# Patient Record
Sex: FEMALE | Race: WHITE | NOT HISPANIC OR LATINO | URBAN - METROPOLITAN AREA
[De-identification: names, ages, dates, MRNs, and addresses within clinical notes are randomized per-mention and may not be internally consistent; named-entity substitution may affect disease eponyms.]

---

## 2023-03-06 ENCOUNTER — TELEPHONE (OUTPATIENT)
Dept: PRIMARY CARE | Facility: CLINIC | Age: 63
End: 2023-03-06
Payer: COMMERCIAL

## 2023-03-06 RX ORDER — ERGOCALCIFEROL 1.25 MG/1
1 CAPSULE ORAL
COMMUNITY
Start: 2018-05-17 | End: 2023-03-07 | Stop reason: SDUPTHER

## 2023-03-07 DIAGNOSIS — E55.9 VITAMIN D DEFICIENCY: Primary | ICD-10-CM

## 2023-03-07 DIAGNOSIS — E55.9 VITAMIN D DEFICIENCY: ICD-10-CM

## 2023-03-07 RX ORDER — ERGOCALCIFEROL 1.25 MG/1
1 CAPSULE ORAL
Qty: 12 CAPSULE | Refills: 0 | Status: SHIPPED | OUTPATIENT
Start: 2023-03-07 | End: 2024-02-15 | Stop reason: SDUPTHER

## 2023-03-07 RX ORDER — PAROXETINE HYDROCHLORIDE 20 MG/1
1 TABLET, FILM COATED ORAL DAILY
COMMUNITY
End: 2023-08-04

## 2023-03-07 RX ORDER — HYDROCHLOROTHIAZIDE 25 MG/1
1 TABLET ORAL DAILY
COMMUNITY
Start: 2019-08-08 | End: 2023-05-04

## 2023-03-07 RX ORDER — LISINOPRIL 20 MG/1
1 TABLET ORAL DAILY
COMMUNITY
Start: 2022-06-07 | End: 2023-04-03 | Stop reason: HOSPADM

## 2023-03-07 RX ORDER — METFORMIN HYDROCHLORIDE 1000 MG/1
1 TABLET ORAL 2 TIMES DAILY
COMMUNITY
End: 2023-05-01 | Stop reason: DRUGHIGH

## 2023-03-07 RX ORDER — ROSUVASTATIN CALCIUM 5 MG/1
1 TABLET, COATED ORAL DAILY
COMMUNITY
Start: 2020-12-15 | End: 2023-07-31

## 2023-03-07 RX ORDER — METOPROLOL SUCCINATE 50 MG/1
1 TABLET, EXTENDED RELEASE ORAL DAILY
COMMUNITY
Start: 2019-08-08 | End: 2023-04-04

## 2023-03-07 RX ORDER — ORAL SEMAGLUTIDE 7 MG/1
1 TABLET ORAL DAILY
COMMUNITY
Start: 2022-04-18 | End: 2023-10-09

## 2023-03-07 RX ORDER — ERGOCALCIFEROL 1.25 MG/1
1 CAPSULE ORAL
Qty: 12 CAPSULE | Refills: 3 | Status: SHIPPED | OUTPATIENT
Start: 2023-03-07 | End: 2023-03-07 | Stop reason: SDUPTHER

## 2023-04-03 DIAGNOSIS — I10 PRIMARY HYPERTENSION: Primary | ICD-10-CM

## 2023-04-03 PROBLEM — J32.9 SINOBRONCHITIS: Status: ACTIVE | Noted: 2023-04-03

## 2023-04-03 PROBLEM — E78.00 ELEVATED SERUM CHOLESTEROL: Status: ACTIVE | Noted: 2023-04-03

## 2023-04-03 PROBLEM — F41.9 ANXIETY DISORDER: Status: ACTIVE | Noted: 2023-04-03

## 2023-04-03 PROBLEM — B37.31 YEAST INFECTION OF THE VAGINA: Status: ACTIVE | Noted: 2023-04-03

## 2023-04-03 PROBLEM — M77.9 TENDONITIS: Status: ACTIVE | Noted: 2023-04-03

## 2023-04-03 PROBLEM — G47.33 OBSTRUCTIVE SLEEP APNEA: Status: ACTIVE | Noted: 2023-04-03

## 2023-04-03 PROBLEM — M22.2X9 PATELLOFEMORAL SYNDROME: Status: ACTIVE | Noted: 2023-04-03

## 2023-04-03 PROBLEM — L28.1 PRURIGO NODULARIS: Status: ACTIVE | Noted: 2023-04-03

## 2023-04-03 PROBLEM — E11.8 TYPE 2 DIABETES MELLITUS WITH COMPLICATION, WITHOUT LONG-TERM CURRENT USE OF INSULIN (MULTI): Status: ACTIVE | Noted: 2023-04-03

## 2023-04-03 PROBLEM — U07.1 COVID-19: Status: ACTIVE | Noted: 2023-04-03

## 2023-04-03 PROBLEM — J30.9 ALLERGIC RHINITIS: Status: ACTIVE | Noted: 2023-04-03

## 2023-04-03 PROBLEM — E55.9 VITAMIN D DEFICIENCY: Status: ACTIVE | Noted: 2023-04-03

## 2023-04-03 PROBLEM — H35.30 AMD (AGE RELATED MACULAR DEGENERATION): Status: ACTIVE | Noted: 2023-04-03

## 2023-04-03 PROBLEM — G43.909 MIGRAINE: Status: ACTIVE | Noted: 2023-04-03

## 2023-04-03 PROBLEM — R53.83 FATIGUE: Status: ACTIVE | Noted: 2023-04-03

## 2023-04-03 PROBLEM — C53.9 CERVICAL CANCER (MULTI): Status: ACTIVE | Noted: 2023-04-03

## 2023-04-03 PROBLEM — R06.83 SNORING: Status: ACTIVE | Noted: 2023-04-03

## 2023-04-03 PROBLEM — R60.0 EDEMA OF LEFT LOWER EXTREMITY: Status: ACTIVE | Noted: 2023-04-03

## 2023-04-03 PROBLEM — J40 SINOBRONCHITIS: Status: ACTIVE | Noted: 2023-04-03

## 2023-04-03 PROBLEM — R92.8 ABNORMAL MAMMOGRAM: Status: ACTIVE | Noted: 2023-04-03

## 2023-04-03 RX ORDER — LISINOPRIL 40 MG/1
40 TABLET ORAL
COMMUNITY
Start: 2023-02-06 | End: 2023-07-03

## 2023-04-04 RX ORDER — METOPROLOL SUCCINATE 50 MG/1
TABLET, EXTENDED RELEASE ORAL
Qty: 90 TABLET | Refills: 0 | Status: SHIPPED | OUTPATIENT
Start: 2023-04-04 | End: 2023-07-21

## 2023-04-24 ENCOUNTER — TELEPHONE (OUTPATIENT)
Dept: PRIMARY CARE | Facility: CLINIC | Age: 63
End: 2023-04-24
Payer: COMMERCIAL

## 2023-04-24 NOTE — TELEPHONE ENCOUNTER
Patient called asking if you want her to get labs drawn before her appointment on May 1st, 2023? If so, please put orders in. She would like them mailed to her beforehand.

## 2023-04-25 DIAGNOSIS — E55.9 VITAMIN D DEFICIENCY: ICD-10-CM

## 2023-04-25 DIAGNOSIS — Z00.00 WELL ADULT HEALTH CHECK: ICD-10-CM

## 2023-04-25 DIAGNOSIS — I10 PRIMARY HYPERTENSION: ICD-10-CM

## 2023-04-25 DIAGNOSIS — E11.8 TYPE 2 DIABETES MELLITUS WITH COMPLICATION, WITHOUT LONG-TERM CURRENT USE OF INSULIN (MULTI): Primary | ICD-10-CM

## 2023-04-25 PROBLEM — J32.9 SINOBRONCHITIS: Status: RESOLVED | Noted: 2023-04-03 | Resolved: 2023-04-25

## 2023-04-25 PROBLEM — J40 SINOBRONCHITIS: Status: RESOLVED | Noted: 2023-04-03 | Resolved: 2023-04-25

## 2023-04-25 PROBLEM — R06.83 SNORING: Status: RESOLVED | Noted: 2023-04-03 | Resolved: 2023-04-25

## 2023-04-25 PROBLEM — M77.9 TENDONITIS: Status: RESOLVED | Noted: 2023-04-03 | Resolved: 2023-04-25

## 2023-04-25 PROBLEM — U07.1 COVID-19: Status: RESOLVED | Noted: 2023-04-03 | Resolved: 2023-04-25

## 2023-04-25 PROBLEM — B37.31 YEAST INFECTION OF THE VAGINA: Status: RESOLVED | Noted: 2023-04-03 | Resolved: 2023-04-25

## 2023-05-01 ENCOUNTER — OFFICE VISIT (OUTPATIENT)
Dept: PRIMARY CARE | Facility: CLINIC | Age: 63
End: 2023-05-01
Payer: COMMERCIAL

## 2023-05-01 VITALS
HEIGHT: 65 IN | BODY MASS INDEX: 39.72 KG/M2 | DIASTOLIC BLOOD PRESSURE: 96 MMHG | SYSTOLIC BLOOD PRESSURE: 153 MMHG | OXYGEN SATURATION: 97 % | RESPIRATION RATE: 16 BRPM | TEMPERATURE: 97.8 F | WEIGHT: 238.4 LBS | HEART RATE: 74 BPM

## 2023-05-01 DIAGNOSIS — E11.8 TYPE 2 DIABETES MELLITUS WITH COMPLICATION, WITHOUT LONG-TERM CURRENT USE OF INSULIN (MULTI): Primary | ICD-10-CM

## 2023-05-01 DIAGNOSIS — I10 PRIMARY HYPERTENSION: ICD-10-CM

## 2023-05-01 DIAGNOSIS — C53.0 MALIGNANT NEOPLASM OF ENDOCERVIX (MULTI): ICD-10-CM

## 2023-05-01 DIAGNOSIS — Z00.00 WELL ADULT HEALTH CHECK: ICD-10-CM

## 2023-05-01 PROBLEM — R60.0 EDEMA OF LEFT LOWER EXTREMITY: Status: RESOLVED | Noted: 2023-04-03 | Resolved: 2023-05-01

## 2023-05-01 PROBLEM — M22.2X9 PATELLOFEMORAL SYNDROME: Status: RESOLVED | Noted: 2023-04-03 | Resolved: 2023-05-01

## 2023-05-01 PROBLEM — E78.5 HYPERLIPIDEMIA: Status: ACTIVE | Noted: 2023-05-01

## 2023-05-01 PROCEDURE — 4010F ACE/ARB THERAPY RXD/TAKEN: CPT | Performed by: FAMILY MEDICINE

## 2023-05-01 PROCEDURE — 3077F SYST BP >= 140 MM HG: CPT | Performed by: FAMILY MEDICINE

## 2023-05-01 PROCEDURE — 99213 OFFICE O/P EST LOW 20 MIN: CPT | Performed by: FAMILY MEDICINE

## 2023-05-01 PROCEDURE — 1036F TOBACCO NON-USER: CPT | Performed by: FAMILY MEDICINE

## 2023-05-01 PROCEDURE — 99396 PREV VISIT EST AGE 40-64: CPT | Performed by: FAMILY MEDICINE

## 2023-05-01 PROCEDURE — 3080F DIAST BP >= 90 MM HG: CPT | Performed by: FAMILY MEDICINE

## 2023-05-01 RX ORDER — METFORMIN HYDROCHLORIDE 500 MG/1
TABLET, EXTENDED RELEASE ORAL
Qty: 120 TABLET | Refills: 6 | Status: SHIPPED | OUTPATIENT
Start: 2023-05-01 | End: 2024-01-02 | Stop reason: SDUPTHER

## 2023-05-01 ASSESSMENT — ENCOUNTER SYMPTOMS
OCCASIONAL FEELINGS OF UNSTEADINESS: 0
LIGHT-HEADEDNESS: 0
FEVER: 0
DEPRESSION: 0
DIZZINESS: 0
LOSS OF SENSATION IN FEET: 0
NAUSEA: 0
DIARRHEA: 1
SHORTNESS OF BREATH: 0
BLOOD IN STOOL: 0
VOMITING: 0

## 2023-05-01 ASSESSMENT — PATIENT HEALTH QUESTIONNAIRE - PHQ9
1. LITTLE INTEREST OR PLEASURE IN DOING THINGS: NOT AT ALL
SUM OF ALL RESPONSES TO PHQ9 QUESTIONS 1 AND 2: 0
2. FEELING DOWN, DEPRESSED OR HOPELESS: NOT AT ALL

## 2023-05-01 NOTE — ASSESSMENT & PLAN NOTE
Monitor your blood pressure at home at least once a week and record.  Please bring results to your next visit.  Take your medicine as prescribed.    Exercise at least 30 minutes daily.  Lose weight.   Avoid eating processed foods, canned foods, etc.    Limit adding salt at the table.

## 2023-05-01 NOTE — PROGRESS NOTES
"Subjective   Valentine Calero is a 62 y.o. female who presents for Annual Exam (Annual physical examination ).    Here for a physical.    Gets diarrhea on and off.  Not sure what it is from.               Review of Systems   Constitutional:  Negative for fever.   Respiratory:  Negative for shortness of breath.    Cardiovascular:  Negative for chest pain.   Gastrointestinal:  Positive for diarrhea. Negative for blood in stool, nausea and vomiting.   Neurological:  Negative for dizziness and light-headedness.   All other systems reviewed and are negative.      Objective   BP (!) 153/96   Pulse 74   Temp 36.6 °C (97.8 °F)   Resp 16   Ht 1.651 m (5' 5\")   Wt 108 kg (238 lb 6.4 oz)   SpO2 97%   BMI 39.67 kg/m²     Physical Exam  HENT:      Head: Normocephalic and atraumatic.      Mouth/Throat:      Mouth: Mucous membranes are moist.      Pharynx: Oropharynx is clear.   Eyes:      Extraocular Movements: Extraocular movements intact.      Pupils: Pupils are equal, round, and reactive to light.   Cardiovascular:      Rate and Rhythm: Normal rate and regular rhythm.      Heart sounds: Normal heart sounds.   Pulmonary:      Effort: Pulmonary effort is normal.      Breath sounds: Normal breath sounds.   Musculoskeletal:         General: Normal range of motion.      Cervical back: Normal range of motion.   Lymphadenopathy:      Cervical: No cervical adenopathy.   Skin:     General: Skin is warm and dry.   Neurological:      General: No focal deficit present.      Mental Status: She is alert.   Psychiatric:         Mood and Affect: Mood normal.         Assessment/Plan   Problem List Items Addressed This Visit          Circulatory    Hypertension     Monitor your blood pressure at home at least once a week and record.  Please bring results to your next visit.  Take your medicine as prescribed.    Exercise at least 30 minutes daily.  Lose weight.   Avoid eating processed foods, canned foods, etc.    Limit adding salt at " the table.             Relevant Orders    Follow Up In [facility] - PCP       Genitourinary    Cervical cancer (CMS/HCC)     Pap next visit.            Endocrine/Metabolic    Type 2 diabetes mellitus with complication, without long-term current use of insulin (CMS/HCC) - Primary    Relevant Medications    metFORMIN XR (Glucophage-XR) 500 mg 24 hr tablet       Other    Well adult health check         There are no Patient Instructions on file for this visit.

## 2023-05-03 DIAGNOSIS — I10 PRIMARY HYPERTENSION: Primary | ICD-10-CM

## 2023-05-04 RX ORDER — HYDROCHLOROTHIAZIDE 25 MG/1
TABLET ORAL
Qty: 90 TABLET | Refills: 1 | Status: SHIPPED | OUTPATIENT
Start: 2023-05-04 | End: 2024-01-16 | Stop reason: SDUPTHER

## 2023-05-04 RX ORDER — METFORMIN HYDROCHLORIDE 1000 MG/1
TABLET ORAL
Qty: 180 TABLET | Refills: 0 | OUTPATIENT
Start: 2023-05-04

## 2023-07-02 DIAGNOSIS — I10 PRIMARY HYPERTENSION: Primary | ICD-10-CM

## 2023-07-03 RX ORDER — LISINOPRIL 40 MG/1
TABLET ORAL
Qty: 90 TABLET | Refills: 0 | Status: SHIPPED | OUTPATIENT
Start: 2023-07-03 | End: 2023-11-06 | Stop reason: SDUPTHER

## 2023-07-11 ENCOUNTER — APPOINTMENT (OUTPATIENT)
Dept: LAB | Facility: LAB | Age: 63
End: 2023-07-11
Payer: COMMERCIAL

## 2023-07-11 ENCOUNTER — LAB (OUTPATIENT)
Dept: LAB | Facility: LAB | Age: 63
End: 2023-07-11
Payer: COMMERCIAL

## 2023-07-11 DIAGNOSIS — E55.9 VITAMIN D DEFICIENCY: ICD-10-CM

## 2023-07-11 DIAGNOSIS — Z00.00 WELL ADULT HEALTH CHECK: ICD-10-CM

## 2023-07-11 DIAGNOSIS — I10 PRIMARY HYPERTENSION: ICD-10-CM

## 2023-07-11 DIAGNOSIS — E11.8 TYPE 2 DIABETES MELLITUS WITH COMPLICATION, WITHOUT LONG-TERM CURRENT USE OF INSULIN (MULTI): ICD-10-CM

## 2023-07-11 LAB
ALANINE AMINOTRANSFERASE (SGPT) (U/L) IN SER/PLAS: 28 U/L (ref 7–45)
ALBUMIN (G/DL) IN SER/PLAS: 4.4 G/DL (ref 3.4–5)
ALBUMIN (MG/L) IN URINE: 29.6 MG/L
ALBUMIN/CREATININE (UG/MG) IN URINE: 23.7 UG/MG CRT (ref 0–30)
ALKALINE PHOSPHATASE (U/L) IN SER/PLAS: 51 U/L (ref 33–136)
ANION GAP IN SER/PLAS: 14 MMOL/L (ref 10–20)
ASPARTATE AMINOTRANSFERASE (SGOT) (U/L) IN SER/PLAS: 29 U/L (ref 9–39)
BILIRUBIN TOTAL (MG/DL) IN SER/PLAS: 0.7 MG/DL (ref 0–1.2)
CALCIDIOL (25 OH VITAMIN D3) (NG/ML) IN SER/PLAS: 34 NG/ML
CALCIUM (MG/DL) IN SER/PLAS: 9.4 MG/DL (ref 8.6–10.3)
CARBON DIOXIDE, TOTAL (MMOL/L) IN SER/PLAS: 29 MMOL/L (ref 21–32)
CHLORIDE (MMOL/L) IN SER/PLAS: 100 MMOL/L (ref 98–107)
CHOLESTEROL (MG/DL) IN SER/PLAS: 171 MG/DL (ref 0–199)
CHOLESTEROL IN HDL (MG/DL) IN SER/PLAS: 41.2 MG/DL
CHOLESTEROL/HDL RATIO: 4.2
CREATININE (MG/DL) IN SER/PLAS: 0.63 MG/DL (ref 0.5–1.05)
CREATININE (MG/DL) IN URINE: 125 MG/DL (ref 20–320)
ESTIMATED AVERAGE GLUCOSE FOR HBA1C: 183 MG/DL
GFR FEMALE: >90 ML/MIN/1.73M2
GLUCOSE (MG/DL) IN SER/PLAS: 137 MG/DL (ref 74–99)
HEMOGLOBIN A1C/HEMOGLOBIN TOTAL IN BLOOD: 8 %
LDL: 93 MG/DL (ref 0–99)
POTASSIUM (MMOL/L) IN SER/PLAS: 3.9 MMOL/L (ref 3.5–5.3)
PROTEIN TOTAL: 7.2 G/DL (ref 6.4–8.2)
SODIUM (MMOL/L) IN SER/PLAS: 139 MMOL/L (ref 136–145)
THYROTROPIN (MIU/L) IN SER/PLAS BY DETECTION LIMIT <= 0.05 MIU/L: 3 MIU/L (ref 0.44–3.98)
TRIGLYCERIDE (MG/DL) IN SER/PLAS: 183 MG/DL (ref 0–149)
UREA NITROGEN (MG/DL) IN SER/PLAS: 10 MG/DL (ref 6–23)
VLDL: 37 MG/DL (ref 0–40)

## 2023-07-11 PROCEDURE — 36415 COLL VENOUS BLD VENIPUNCTURE: CPT

## 2023-07-11 PROCEDURE — 80053 COMPREHEN METABOLIC PANEL: CPT

## 2023-07-11 PROCEDURE — 83036 HEMOGLOBIN GLYCOSYLATED A1C: CPT

## 2023-07-11 PROCEDURE — 80061 LIPID PANEL: CPT

## 2023-07-11 PROCEDURE — 84443 ASSAY THYROID STIM HORMONE: CPT

## 2023-07-11 PROCEDURE — 82043 UR ALBUMIN QUANTITATIVE: CPT

## 2023-07-11 PROCEDURE — 82570 ASSAY OF URINE CREATININE: CPT

## 2023-07-11 PROCEDURE — 82306 VITAMIN D 25 HYDROXY: CPT

## 2023-07-12 ENCOUNTER — TELEPHONE (OUTPATIENT)
Dept: PRIMARY CARE | Facility: CLINIC | Age: 63
End: 2023-07-12
Payer: COMMERCIAL

## 2023-07-12 NOTE — TELEPHONE ENCOUNTER
----- Message from Naz Becerra MD sent at 7/11/2023  6:33 PM EDT -----  Please let her know her labs look ok.   ----- Message -----  From: Lab, Background User  Sent: 7/11/2023   5:15 PM EDT  To: Naz Becerra MD

## 2023-07-17 ENCOUNTER — TELEPHONE (OUTPATIENT)
Dept: PRIMARY CARE | Facility: CLINIC | Age: 63
End: 2023-07-17
Payer: COMMERCIAL

## 2023-07-17 NOTE — TELEPHONE ENCOUNTER
----- Message from Naz Becerra MD sent at 7/14/2023  2:20 PM EDT -----  Please call the patient regarding her abnormal result. Her hgb A1c is much higher than last time.  Make sure she is taking her meds, exercising regularly and watching her diet.    The rest of the labs were ok

## 2023-07-20 DIAGNOSIS — I10 PRIMARY HYPERTENSION: ICD-10-CM

## 2023-07-21 RX ORDER — METOPROLOL SUCCINATE 50 MG/1
TABLET, EXTENDED RELEASE ORAL
Qty: 90 TABLET | Refills: 1 | Status: SHIPPED | OUTPATIENT
Start: 2023-07-21 | End: 2024-01-09 | Stop reason: SDUPTHER

## 2023-07-31 DIAGNOSIS — E78.2 MIXED HYPERLIPIDEMIA: Primary | ICD-10-CM

## 2023-07-31 RX ORDER — ROSUVASTATIN CALCIUM 5 MG/1
5 TABLET, COATED ORAL DAILY
Qty: 90 TABLET | Refills: 1 | Status: SHIPPED | OUTPATIENT
Start: 2023-07-31 | End: 2024-02-15 | Stop reason: SDUPTHER

## 2023-08-04 DIAGNOSIS — F41.1 GENERALIZED ANXIETY DISORDER: Primary | ICD-10-CM

## 2023-08-04 RX ORDER — PAROXETINE HYDROCHLORIDE 20 MG/1
20 TABLET, FILM COATED ORAL DAILY
Qty: 90 TABLET | Refills: 1 | Status: SHIPPED | OUTPATIENT
Start: 2023-08-04 | End: 2024-02-15 | Stop reason: SDUPTHER

## 2023-08-28 ENCOUNTER — OFFICE VISIT (OUTPATIENT)
Dept: PRIMARY CARE | Facility: CLINIC | Age: 63
End: 2023-08-28
Payer: COMMERCIAL

## 2023-08-28 VITALS
OXYGEN SATURATION: 99 % | DIASTOLIC BLOOD PRESSURE: 102 MMHG | BODY MASS INDEX: 39.27 KG/M2 | RESPIRATION RATE: 22 BRPM | SYSTOLIC BLOOD PRESSURE: 162 MMHG | TEMPERATURE: 97.7 F | WEIGHT: 236 LBS | HEART RATE: 94 BPM

## 2023-08-28 DIAGNOSIS — J06.9 VIRAL UPPER RESPIRATORY TRACT INFECTION: Primary | ICD-10-CM

## 2023-08-28 DIAGNOSIS — J02.9 SORE THROAT: ICD-10-CM

## 2023-08-28 LAB — POC RAPID STREP: NEGATIVE

## 2023-08-28 PROCEDURE — 87651 STREP A DNA AMP PROBE: CPT

## 2023-08-28 PROCEDURE — 3080F DIAST BP >= 90 MM HG: CPT | Performed by: FAMILY MEDICINE

## 2023-08-28 PROCEDURE — 99214 OFFICE O/P EST MOD 30 MIN: CPT | Performed by: FAMILY MEDICINE

## 2023-08-28 PROCEDURE — 1036F TOBACCO NON-USER: CPT | Performed by: FAMILY MEDICINE

## 2023-08-28 PROCEDURE — 3052F HG A1C>EQUAL 8.0%<EQUAL 9.0%: CPT | Performed by: FAMILY MEDICINE

## 2023-08-28 PROCEDURE — 87880 STREP A ASSAY W/OPTIC: CPT | Performed by: FAMILY MEDICINE

## 2023-08-28 PROCEDURE — 3077F SYST BP >= 140 MM HG: CPT | Performed by: FAMILY MEDICINE

## 2023-08-28 PROCEDURE — 4010F ACE/ARB THERAPY RXD/TAKEN: CPT | Performed by: FAMILY MEDICINE

## 2023-08-28 RX ORDER — BENZONATATE 100 MG/1
100 CAPSULE ORAL 3 TIMES DAILY PRN
Qty: 42 CAPSULE | Refills: 0 | Status: SHIPPED | OUTPATIENT
Start: 2023-08-28 | End: 2023-09-27

## 2023-08-28 ASSESSMENT — ENCOUNTER SYMPTOMS
NAUSEA: 0
BLOOD IN STOOL: 0
FEVER: 0
DIARRHEA: 0
DYSURIA: 0
DIFFICULTY URINATING: 0
HEADACHES: 0
MYALGIAS: 1
MYALGIAS: 0
VOMITING: 0
HEMATURIA: 0
COUGH: 1
SORE THROAT: 1
WHEEZING: 1
RHINORRHEA: 0
SHORTNESS OF BREATH: 1
CONSTIPATION: 0
FATIGUE: 1

## 2023-08-28 NOTE — PROGRESS NOTES
Subjective   Patient ID: Valentine Calero is a 62 y.o. female who presents for Sore Throat.    Cough  This is a new problem. The current episode started in the past 7 days (3 days). The problem has been unchanged. The cough is Productive of sputum. Associated symptoms include a sore throat, shortness of breath and wheezing. Pertinent negatives include no chest pain, ear pain, fever, headaches, myalgias or rhinorrhea. The symptoms are aggravated by lying down.        Review of Systems   Constitutional:  Positive for fatigue. Negative for fever.        Uri x 1 wk.  Pt did not test for covid at home   HENT:  Positive for congestion and sore throat. Negative for ear pain and rhinorrhea.         Ears feel full.   No known contagious exposures. No travel.  Tried      mucinex, delsym.   Respiratory:  Positive for cough, shortness of breath and wheezing.    Cardiovascular:  Negative for chest pain.        Pt hasn't taken bp med in 2 d.   Gastrointestinal:  Negative for blood in stool, constipation, diarrhea, nausea and vomiting.   Genitourinary:  Negative for difficulty urinating, dysuria and hematuria.   Musculoskeletal:  Negative for myalgias.   Neurological:  Negative for headaches.       Objective   BP (!) 162/102   Pulse 94   Temp 36.5 °C (97.7 °F) (Temporal)   Resp 22   Wt 107 kg (236 lb)   SpO2 99%   BMI 39.27 kg/m²     Physical Exam  Vitals and nursing note reviewed.   Constitutional:       General: She is not in acute distress.     Appearance: Normal appearance.   HENT:      Head: Normocephalic and atraumatic.      Right Ear: Tympanic membrane, ear canal and external ear normal.      Left Ear: Tympanic membrane, ear canal and external ear normal.      Nose: Congestion present.      Mouth/Throat:      Mouth: Mucous membranes are moist.      Pharynx: Posterior oropharyngeal erythema present.   Cardiovascular:      Rate and Rhythm: Normal rate and regular rhythm.      Heart sounds: Normal heart sounds.    Pulmonary:      Effort: Pulmonary effort is normal. No respiratory distress.      Breath sounds: Normal breath sounds. No wheezing.   Musculoskeletal:      Cervical back: Neck supple.   Lymphadenopathy:      Cervical: No cervical adenopathy.   Skin:     General: Skin is warm and dry.   Neurological:      General: No focal deficit present.      Mental Status: She is alert.         Assessment/Plan   Problem List Items Addressed This Visit       Sore throat     Advise pt rst negative, will send pcr and notify pt if positive and tx.  May use saline gargle, tylenol or motrin, throat spray/lozenges         Relevant Orders    POCT rapid strep A manually resulted (Completed)    Group A Streptococcus, PCR    Viral upper respiratory tract infection - Primary     Advise pt to use otc mucinex and flonase daily x 2 wk  Take tessalon perles as directed  Rest, increase flds  Pt declined covid testing today  F/up if no improvement         Relevant Medications    benzonatate (Tessalon) 100 mg capsule

## 2023-08-28 NOTE — ASSESSMENT & PLAN NOTE
Advise pt to use otc mucinex and flonase daily x 2 wk  Take tessalon perles as directed  Rest, increase flds  Pt declined covid testing today  F/up if no improvement

## 2023-08-28 NOTE — ASSESSMENT & PLAN NOTE
Advise pt rst negative, will send pcr and notify pt if positive and tx.  May use saline gargle, tylenol or motrin, throat spray/lozenges

## 2023-08-29 LAB — GROUP A STREP, PCR: NOT DETECTED

## 2023-10-07 DIAGNOSIS — E11.8 TYPE 2 DIABETES MELLITUS WITH COMPLICATION, WITHOUT LONG-TERM CURRENT USE OF INSULIN (MULTI): Primary | ICD-10-CM

## 2023-10-09 RX ORDER — ORAL SEMAGLUTIDE 7 MG/1
1 TABLET ORAL DAILY
Qty: 90 TABLET | Refills: 0 | Status: SHIPPED | OUTPATIENT
Start: 2023-10-09 | End: 2023-11-06 | Stop reason: DRUGHIGH

## 2023-11-06 ENCOUNTER — OFFICE VISIT (OUTPATIENT)
Dept: PRIMARY CARE | Facility: CLINIC | Age: 63
End: 2023-11-06
Payer: COMMERCIAL

## 2023-11-06 VITALS
BODY MASS INDEX: 39.82 KG/M2 | TEMPERATURE: 96.3 F | HEIGHT: 65 IN | HEART RATE: 77 BPM | WEIGHT: 239 LBS | SYSTOLIC BLOOD PRESSURE: 142 MMHG | DIASTOLIC BLOOD PRESSURE: 92 MMHG

## 2023-11-06 DIAGNOSIS — C53.9 MALIGNANT NEOPLASM OF CERVIX, UNSPECIFIED SITE (MULTI): ICD-10-CM

## 2023-11-06 DIAGNOSIS — I10 PRIMARY HYPERTENSION: ICD-10-CM

## 2023-11-06 DIAGNOSIS — E11.8 TYPE 2 DIABETES MELLITUS WITH COMPLICATION, WITHOUT LONG-TERM CURRENT USE OF INSULIN (MULTI): Primary | ICD-10-CM

## 2023-11-06 DIAGNOSIS — Z12.4 SCREENING FOR CERVICAL CANCER: ICD-10-CM

## 2023-11-06 DIAGNOSIS — Z12.31 ENCOUNTER FOR SCREENING MAMMOGRAM FOR MALIGNANT NEOPLASM OF BREAST: ICD-10-CM

## 2023-11-06 PROBLEM — J06.9 VIRAL UPPER RESPIRATORY TRACT INFECTION: Status: RESOLVED | Noted: 2023-08-28 | Resolved: 2023-11-06

## 2023-11-06 PROBLEM — J02.9 SORE THROAT: Status: RESOLVED | Noted: 2023-08-28 | Resolved: 2023-11-06

## 2023-11-06 LAB — POC HEMOGLOBIN A1C: 7.7 % (ref 4.2–6.5)

## 2023-11-06 PROCEDURE — 1036F TOBACCO NON-USER: CPT | Performed by: FAMILY MEDICINE

## 2023-11-06 PROCEDURE — 87624 HPV HI-RISK TYP POOLED RSLT: CPT

## 2023-11-06 PROCEDURE — 4010F ACE/ARB THERAPY RXD/TAKEN: CPT | Performed by: FAMILY MEDICINE

## 2023-11-06 PROCEDURE — 3077F SYST BP >= 140 MM HG: CPT | Performed by: FAMILY MEDICINE

## 2023-11-06 PROCEDURE — 83036 HEMOGLOBIN GLYCOSYLATED A1C: CPT | Performed by: FAMILY MEDICINE

## 2023-11-06 PROCEDURE — 99214 OFFICE O/P EST MOD 30 MIN: CPT | Performed by: FAMILY MEDICINE

## 2023-11-06 PROCEDURE — 3080F DIAST BP >= 90 MM HG: CPT | Performed by: FAMILY MEDICINE

## 2023-11-06 PROCEDURE — 3052F HG A1C>EQUAL 8.0%<EQUAL 9.0%: CPT | Performed by: FAMILY MEDICINE

## 2023-11-06 PROCEDURE — 88175 CYTOPATH C/V AUTO FLUID REDO: CPT

## 2023-11-06 RX ORDER — LISINOPRIL 40 MG/1
40 TABLET ORAL
Qty: 90 TABLET | Refills: 3 | Status: SHIPPED | OUTPATIENT
Start: 2023-11-06 | End: 2023-11-07 | Stop reason: SDUPTHER

## 2023-11-06 RX ORDER — LISINOPRIL 40 MG/1
40 TABLET ORAL
Qty: 90 TABLET | Refills: 3 | Status: SHIPPED | OUTPATIENT
Start: 2023-11-06 | End: 2023-11-06 | Stop reason: SDUPTHER

## 2023-11-06 ASSESSMENT — ENCOUNTER SYMPTOMS
FEVER: 0
DIZZINESS: 0
LIGHT-HEADEDNESS: 0
NAUSEA: 0
SHORTNESS OF BREATH: 0
VOMITING: 0

## 2023-11-06 NOTE — PROGRESS NOTES
"Subjective   Valentine Calero is a 62 y.o. female who presents for Hypertension (6 month check/ pap).    Here for a recheck and a pap.  No complaints.           Review of Systems   Constitutional:  Negative for fever.   Respiratory:  Negative for shortness of breath.    Cardiovascular:  Negative for chest pain.   Gastrointestinal:  Negative for nausea and vomiting.   Neurological:  Negative for dizziness and light-headedness.   All other systems reviewed and are negative.      Objective   BP (!) 142/92 Comment: 2nd taken BP  Pulse 77   Temp 35.7 °C (96.3 °F)   Ht 1.651 m (5' 5\")   Wt 108 kg (239 lb)   BMI 39.77 kg/m²     Physical Exam  HENT:      Head: Normocephalic and atraumatic.      Mouth/Throat:      Mouth: Mucous membranes are moist.      Pharynx: Oropharynx is clear.   Eyes:      Extraocular Movements: Extraocular movements intact.      Pupils: Pupils are equal, round, and reactive to light.   Cardiovascular:      Rate and Rhythm: Normal rate and regular rhythm.      Heart sounds: Normal heart sounds.   Pulmonary:      Effort: Pulmonary effort is normal.      Breath sounds: Normal breath sounds.   Genitourinary:     General: Normal vulva.      Exam position: Lithotomy position.      Comments: Normal vagina   Sample taken from the vaginal vault.  No lesions noted.    Musculoskeletal:         General: Normal range of motion.      Cervical back: Normal range of motion.   Lymphadenopathy:      Cervical: No cervical adenopathy.   Skin:     General: Skin is warm and dry.   Neurological:      General: No focal deficit present.      Mental Status: She is alert.   Psychiatric:         Mood and Affect: Mood normal.         Assessment/Plan   Problem List Items Addressed This Visit       Cervical cancer (CMS/HCC)    Primary hypertension    Relevant Medications    lisinopril 40 mg tablet    Type 2 diabetes mellitus with complication, without long-term current use of insulin (CMS/HCC) - Primary     Hgba1c still too " high.    Reviewed diet and exercise.    Increase rybelsus to 14 mg daily         Relevant Medications    semaglutide (Rybelsus) 14 mg tablet tablet    Other Relevant Orders    POCT glycosylated hemoglobin (Hb A1C) manually resulted (Completed)    Follow Up In [facility] - PCP - Established     Other Visit Diagnoses       Encounter for screening mammogram for malignant neoplasm of breast        Relevant Orders    BI mammo bilateral screening tomosynthesis    Screening for cervical cancer        Relevant Orders    THINPREP PAP TEST              There are no Patient Instructions on file for this visit.

## 2023-11-07 ENCOUNTER — TELEPHONE (OUTPATIENT)
Dept: PRIMARY CARE | Facility: CLINIC | Age: 63
End: 2023-11-07
Payer: COMMERCIAL

## 2023-11-07 DIAGNOSIS — I10 PRIMARY HYPERTENSION: ICD-10-CM

## 2023-11-07 RX ORDER — LISINOPRIL 40 MG/1
40 TABLET ORAL
Qty: 90 TABLET | Refills: 3 | Status: SHIPPED | OUTPATIENT
Start: 2023-11-07 | End: 2024-05-13 | Stop reason: SDUPTHER

## 2023-11-07 RX ORDER — LORATADINE 10 MG
10 TABLET,DISINTEGRATING ORAL DAILY
COMMUNITY
End: 2024-05-13 | Stop reason: ALTCHOICE

## 2023-11-07 NOTE — TELEPHONE ENCOUNTER
She lm and also faxed her med list. She said that it was not correct  I got fax and compared and all correct except we did not have Ocu quinton and claritin on her list so I added them.

## 2023-12-10 LAB
CYTOLOGY CMNT CVX/VAG CYTO-IMP: NORMAL
HPV HR 12 DNA GENITAL QL NAA+PROBE: NEGATIVE
HPV HR GENOTYPES PNL CVX NAA+PROBE: NEGATIVE
HPV16 DNA SPEC QL NAA+PROBE: NEGATIVE
HPV18 DNA SPEC QL NAA+PROBE: NEGATIVE
LAB AP HPV GENOTYPE QUESTION: YES
LAB AP HPV HR: NORMAL
LABORATORY COMMENT REPORT: NORMAL
PATH REPORT.TOTAL CANCER: NORMAL

## 2023-12-11 ENCOUNTER — APPOINTMENT (OUTPATIENT)
Dept: RADIOLOGY | Facility: CLINIC | Age: 63
End: 2023-12-11
Payer: COMMERCIAL

## 2023-12-15 ENCOUNTER — APPOINTMENT (OUTPATIENT)
Dept: RADIOLOGY | Facility: CLINIC | Age: 63
End: 2023-12-15
Payer: COMMERCIAL

## 2023-12-20 ENCOUNTER — APPOINTMENT (OUTPATIENT)
Dept: RADIOLOGY | Facility: CLINIC | Age: 63
End: 2023-12-20
Payer: COMMERCIAL

## 2023-12-29 ENCOUNTER — TELEMEDICINE (OUTPATIENT)
Dept: PRIMARY CARE | Facility: CLINIC | Age: 63
End: 2023-12-29
Payer: COMMERCIAL

## 2023-12-29 DIAGNOSIS — U07.1 COVID-19: Primary | ICD-10-CM

## 2023-12-29 PROCEDURE — 99213 OFFICE O/P EST LOW 20 MIN: CPT | Performed by: FAMILY MEDICINE

## 2023-12-29 NOTE — PROGRESS NOTES
"Elizabeth Calero \"Jaclyn\" is a 63 y.o. female who presents for No chief complaint on file..    Her  got COVID over [place] and a couple days ago she developed fever upper respiratory symptoms and achy all over she took a home COVID test and it was positive.  She denies shortness of breath or chest pain.  Symptoms are mild.         Review of Systems    Objective   There were no vitals taken for this visit.    Physical Exam  Constitutional:       General: She is not in acute distress.     Appearance: Normal appearance.   Neurological:      Mental Status: She is alert.         Assessment/Plan   Problem List Items Addressed This Visit       COVID-19 - Primary     Advised fluids rest Tylenol and supportive care.  She understands quarantine precautions for 5 days and followed by 5 days of mask wearing.  She should check with her employer for additional policies at the workplace.  She declines Paxlovid.              Patient Instructions   Please keep your already scheduled follow-up appointment  "

## 2023-12-29 NOTE — ASSESSMENT & PLAN NOTE
Advised fluids rest Tylenol and supportive care.  She understands quarantine precautions for 5 days and followed by 5 days of mask wearing.  She should check with her employer for additional policies at the workplace.  She declines Paxlovid.

## 2024-01-02 DIAGNOSIS — E11.8 TYPE 2 DIABETES MELLITUS WITH COMPLICATION, WITHOUT LONG-TERM CURRENT USE OF INSULIN (MULTI): ICD-10-CM

## 2024-01-02 RX ORDER — METFORMIN HYDROCHLORIDE 500 MG/1
TABLET, EXTENDED RELEASE ORAL
Qty: 120 TABLET | Refills: 6 | Status: SHIPPED | OUTPATIENT
Start: 2024-01-02 | End: 2024-05-13 | Stop reason: SDUPTHER

## 2024-01-09 DIAGNOSIS — I10 PRIMARY HYPERTENSION: ICD-10-CM

## 2024-01-09 RX ORDER — METOPROLOL SUCCINATE 50 MG/1
50 TABLET, EXTENDED RELEASE ORAL DAILY
Qty: 90 TABLET | Refills: 1 | Status: SHIPPED | OUTPATIENT
Start: 2024-01-09 | End: 2024-05-13 | Stop reason: SDUPTHER

## 2024-01-15 ENCOUNTER — APPOINTMENT (OUTPATIENT)
Dept: RADIOLOGY | Facility: CLINIC | Age: 64
End: 2024-01-15
Payer: COMMERCIAL

## 2024-01-16 DIAGNOSIS — I10 PRIMARY HYPERTENSION: ICD-10-CM

## 2024-01-16 RX ORDER — HYDROCHLOROTHIAZIDE 25 MG/1
25 TABLET ORAL DAILY
Qty: 90 TABLET | Refills: 1 | Status: SHIPPED | OUTPATIENT
Start: 2024-01-16 | End: 2024-05-13 | Stop reason: SDUPTHER

## 2024-02-15 DIAGNOSIS — E78.2 MIXED HYPERLIPIDEMIA: ICD-10-CM

## 2024-02-15 DIAGNOSIS — F41.1 GENERALIZED ANXIETY DISORDER: ICD-10-CM

## 2024-02-15 DIAGNOSIS — E55.9 VITAMIN D DEFICIENCY: ICD-10-CM

## 2024-02-15 RX ORDER — ROSUVASTATIN CALCIUM 5 MG/1
5 TABLET, COATED ORAL DAILY
Qty: 90 TABLET | Refills: 2 | Status: SHIPPED | OUTPATIENT
Start: 2024-02-15 | End: 2024-05-13 | Stop reason: SDUPTHER

## 2024-02-15 RX ORDER — PAROXETINE HYDROCHLORIDE 20 MG/1
20 TABLET, FILM COATED ORAL DAILY
Qty: 90 TABLET | Refills: 2 | Status: SHIPPED | OUTPATIENT
Start: 2024-02-15 | End: 2024-05-13 | Stop reason: SDUPTHER

## 2024-02-15 RX ORDER — ERGOCALCIFEROL 1.25 MG/1
1 CAPSULE ORAL
Qty: 12 CAPSULE | Refills: 0 | Status: SHIPPED | OUTPATIENT
Start: 2024-02-15 | End: 2024-05-13 | Stop reason: SDUPTHER

## 2024-04-04 ENCOUNTER — APPOINTMENT (OUTPATIENT)
Dept: RADIOLOGY | Facility: HOSPITAL | Age: 64
End: 2024-04-04
Payer: COMMERCIAL

## 2024-04-16 ENCOUNTER — HOSPITAL ENCOUNTER (OUTPATIENT)
Dept: RADIOLOGY | Facility: HOSPITAL | Age: 64
Discharge: HOME | End: 2024-04-16
Payer: COMMERCIAL

## 2024-04-16 VITALS — WEIGHT: 230 LBS | BODY MASS INDEX: 38.32 KG/M2 | HEIGHT: 65 IN

## 2024-04-16 DIAGNOSIS — Z12.31 ENCOUNTER FOR SCREENING MAMMOGRAM FOR MALIGNANT NEOPLASM OF BREAST: ICD-10-CM

## 2024-04-16 PROCEDURE — 77067 SCR MAMMO BI INCL CAD: CPT | Performed by: RADIOLOGY

## 2024-04-16 PROCEDURE — 77063 BREAST TOMOSYNTHESIS BI: CPT | Performed by: RADIOLOGY

## 2024-04-16 PROCEDURE — 77063 BREAST TOMOSYNTHESIS BI: CPT

## 2024-05-13 ENCOUNTER — OFFICE VISIT (OUTPATIENT)
Dept: PRIMARY CARE | Facility: CLINIC | Age: 64
End: 2024-05-13
Payer: COMMERCIAL

## 2024-05-13 VITALS
SYSTOLIC BLOOD PRESSURE: 146 MMHG | TEMPERATURE: 97.3 F | BODY MASS INDEX: 38.29 KG/M2 | DIASTOLIC BLOOD PRESSURE: 88 MMHG | HEART RATE: 76 BPM | WEIGHT: 230.13 LBS

## 2024-05-13 DIAGNOSIS — E11.8 TYPE 2 DIABETES MELLITUS WITH COMPLICATION, WITHOUT LONG-TERM CURRENT USE OF INSULIN (MULTI): Primary | ICD-10-CM

## 2024-05-13 DIAGNOSIS — J30.9 ALLERGIC RHINITIS, UNSPECIFIED SEASONALITY, UNSPECIFIED TRIGGER: ICD-10-CM

## 2024-05-13 DIAGNOSIS — I10 PRIMARY HYPERTENSION: ICD-10-CM

## 2024-05-13 DIAGNOSIS — E55.9 VITAMIN D DEFICIENCY: ICD-10-CM

## 2024-05-13 DIAGNOSIS — C53.9 MALIGNANT NEOPLASM OF CERVIX, UNSPECIFIED SITE (MULTI): ICD-10-CM

## 2024-05-13 DIAGNOSIS — Z00.00 WELL ADULT HEALTH CHECK: ICD-10-CM

## 2024-05-13 DIAGNOSIS — E78.2 MIXED HYPERLIPIDEMIA: ICD-10-CM

## 2024-05-13 DIAGNOSIS — F41.1 GENERALIZED ANXIETY DISORDER: ICD-10-CM

## 2024-05-13 PROBLEM — U07.1 COVID-19: Status: RESOLVED | Noted: 2023-04-03 | Resolved: 2024-05-13

## 2024-05-13 LAB — POC HEMOGLOBIN A1C: 6.5 % (ref 4.2–6.5)

## 2024-05-13 PROCEDURE — 3079F DIAST BP 80-89 MM HG: CPT | Performed by: FAMILY MEDICINE

## 2024-05-13 PROCEDURE — 83036 HEMOGLOBIN GLYCOSYLATED A1C: CPT | Performed by: FAMILY MEDICINE

## 2024-05-13 PROCEDURE — 1036F TOBACCO NON-USER: CPT | Performed by: FAMILY MEDICINE

## 2024-05-13 PROCEDURE — 4010F ACE/ARB THERAPY RXD/TAKEN: CPT | Performed by: FAMILY MEDICINE

## 2024-05-13 PROCEDURE — 3077F SYST BP >= 140 MM HG: CPT | Performed by: FAMILY MEDICINE

## 2024-05-13 PROCEDURE — 99214 OFFICE O/P EST MOD 30 MIN: CPT | Performed by: FAMILY MEDICINE

## 2024-05-13 RX ORDER — LORATADINE 10 MG/1
10 TABLET ORAL DAILY
Qty: 30 TABLET | Refills: 2 | Status: SHIPPED | OUTPATIENT
Start: 2024-05-13 | End: 2024-08-11

## 2024-05-13 RX ORDER — HYDROCHLOROTHIAZIDE 25 MG/1
25 TABLET ORAL DAILY
Qty: 90 TABLET | Refills: 3 | Status: SHIPPED | OUTPATIENT
Start: 2024-05-13

## 2024-05-13 RX ORDER — PAROXETINE HYDROCHLORIDE 20 MG/1
20 TABLET, FILM COATED ORAL DAILY
Qty: 90 TABLET | Refills: 3 | Status: SHIPPED | OUTPATIENT
Start: 2024-05-13

## 2024-05-13 RX ORDER — ROSUVASTATIN CALCIUM 5 MG/1
5 TABLET, COATED ORAL DAILY
Qty: 90 TABLET | Refills: 3 | Status: SHIPPED | OUTPATIENT
Start: 2024-05-13

## 2024-05-13 RX ORDER — LISINOPRIL 40 MG/1
40 TABLET ORAL
Qty: 90 TABLET | Refills: 3 | Status: SHIPPED | OUTPATIENT
Start: 2024-05-13

## 2024-05-13 RX ORDER — METFORMIN HYDROCHLORIDE 500 MG/1
TABLET, EXTENDED RELEASE ORAL
Qty: 360 TABLET | Refills: 3 | Status: SHIPPED | OUTPATIENT
Start: 2024-05-13

## 2024-05-13 RX ORDER — METOPROLOL SUCCINATE 50 MG/1
50 TABLET, EXTENDED RELEASE ORAL DAILY
Qty: 90 TABLET | Refills: 3 | Status: SHIPPED | OUTPATIENT
Start: 2024-05-13

## 2024-05-13 RX ORDER — LORATADINE 10 MG
10 TABLET,DISINTEGRATING ORAL DAILY
Qty: 90 TABLET | Refills: 3 | Status: CANCELLED | OUTPATIENT
Start: 2024-05-13

## 2024-05-13 RX ORDER — ERGOCALCIFEROL 1.25 MG/1
1 CAPSULE ORAL
Qty: 12 CAPSULE | Refills: 0 | Status: SHIPPED | OUTPATIENT
Start: 2024-05-13

## 2024-05-13 ASSESSMENT — ENCOUNTER SYMPTOMS
LIGHT-HEADEDNESS: 0
SHORTNESS OF BREATH: 0
FEVER: 0
VOMITING: 0
NAUSEA: 0
DIZZINESS: 0

## 2024-05-13 NOTE — PROGRESS NOTES
"Elizabeth Calero \"Jaclyn\" is a 63 y.o. female who presents for Diabetes.    Doing well.  Trying to eat better.  Eating less at work.  Walking more.   Sugars have been stable.    Stable on her meds.      Mom passed away a few months ago.  She had advanced dementia.    Has lost 9# since November.           Review of Systems   Constitutional:  Negative for fever.   Respiratory:  Negative for shortness of breath.    Cardiovascular:  Negative for chest pain.   Gastrointestinal:  Negative for nausea and vomiting.   Neurological:  Negative for dizziness and light-headedness.   All other systems reviewed and are negative.      Objective   /88   Pulse 76   Temp 36.3 °C (97.3 °F)   Wt 104 kg (230 lb 2 oz)   BMI 38.29 kg/m²     Physical Exam  HENT:      Head: Normocephalic and atraumatic.      Mouth/Throat:      Mouth: Mucous membranes are moist.      Pharynx: Oropharynx is clear.   Eyes:      Extraocular Movements: Extraocular movements intact.      Pupils: Pupils are equal, round, and reactive to light.   Cardiovascular:      Rate and Rhythm: Normal rate and regular rhythm.      Heart sounds: Normal heart sounds.   Pulmonary:      Effort: Pulmonary effort is normal.      Breath sounds: Normal breath sounds.   Musculoskeletal:         General: Normal range of motion.      Cervical back: Normal range of motion.   Lymphadenopathy:      Cervical: No cervical adenopathy.   Skin:     General: Skin is warm and dry.   Neurological:      General: No focal deficit present.      Mental Status: She is alert.   Psychiatric:         Mood and Affect: Mood normal.         Assessment/Plan   Problem List Items Addressed This Visit       Allergic rhinitis    Relevant Medications    loratadine (Claritin) 10 mg tablet    Anxiety disorder    Relevant Medications    PARoxetine (Paxil) 20 mg tablet    Cervical cancer (Multi)    Primary hypertension     Monitor your blood pressure at home at least once a week and record.  " Please bring results to your next visit.  Take your medicine as prescribed.    Exercise at least 30 minutes daily.  Lose weight.   Avoid eating processed foods, canned foods, etc.    Limit adding salt at the table.             Relevant Medications    hydroCHLOROthiazide (HYDRODiuril) 25 mg tablet    lisinopril 40 mg tablet    metoprolol succinate XL (Toprol-XL) 50 mg 24 hr tablet    Type 2 diabetes mellitus with complication, without long-term current use of insulin (Multi) - Primary     We discussed diet and exercise.          Relevant Medications    metFORMIN  mg 24 hr tablet    semaglutide (Rybelsus) 14 mg tablet tablet    Other Relevant Orders    POCT glycosylated hemoglobin (Hb A1C) manually resulted (Completed)    Vitamin D deficiency    Relevant Medications    ergocalciferol (Vitamin D-2) 1.25 MG (02053 UT) capsule    Hyperlipidemia    Relevant Medications    rosuvastatin (Crestor) 5 mg tablet    Well adult health check    Relevant Orders    Follow Up In [facility] - PCP - Health Maintenance         There are no Patient Instructions on file for this visit.

## 2024-09-05 ENCOUNTER — OFFICE VISIT (OUTPATIENT)
Dept: PRIMARY CARE | Facility: CLINIC | Age: 64
End: 2024-09-05
Payer: COMMERCIAL

## 2024-09-05 ENCOUNTER — HOSPITAL ENCOUNTER (OUTPATIENT)
Dept: RADIOLOGY | Facility: CLINIC | Age: 64
Discharge: HOME | End: 2024-09-05
Payer: COMMERCIAL

## 2024-09-05 VITALS
TEMPERATURE: 97.2 F | BODY MASS INDEX: 38.52 KG/M2 | SYSTOLIC BLOOD PRESSURE: 158 MMHG | WEIGHT: 231.5 LBS | HEART RATE: 72 BPM | DIASTOLIC BLOOD PRESSURE: 89 MMHG

## 2024-09-05 DIAGNOSIS — M47.812 OSTEOARTHRITIS OF CERVICAL SPINE, UNSPECIFIED SPINAL OSTEOARTHRITIS COMPLICATION STATUS: ICD-10-CM

## 2024-09-05 DIAGNOSIS — R52 PAIN: Primary | ICD-10-CM

## 2024-09-05 DIAGNOSIS — R52 PAIN: ICD-10-CM

## 2024-09-05 DIAGNOSIS — M25.512 ACUTE PAIN OF LEFT SHOULDER: ICD-10-CM

## 2024-09-05 PROCEDURE — 4010F ACE/ARB THERAPY RXD/TAKEN: CPT | Performed by: NURSE PRACTITIONER

## 2024-09-05 PROCEDURE — 72040 X-RAY EXAM NECK SPINE 2-3 VW: CPT | Performed by: RADIOLOGY

## 2024-09-05 PROCEDURE — 72040 X-RAY EXAM NECK SPINE 2-3 VW: CPT

## 2024-09-05 PROCEDURE — 3079F DIAST BP 80-89 MM HG: CPT | Performed by: NURSE PRACTITIONER

## 2024-09-05 PROCEDURE — 96372 THER/PROPH/DIAG INJ SC/IM: CPT | Performed by: NURSE PRACTITIONER

## 2024-09-05 PROCEDURE — 3077F SYST BP >= 140 MM HG: CPT | Performed by: NURSE PRACTITIONER

## 2024-09-05 PROCEDURE — 1036F TOBACCO NON-USER: CPT | Performed by: NURSE PRACTITIONER

## 2024-09-05 PROCEDURE — 99214 OFFICE O/P EST MOD 30 MIN: CPT | Performed by: NURSE PRACTITIONER

## 2024-09-05 RX ORDER — CYCLOBENZAPRINE HCL 5 MG
5 TABLET ORAL NIGHTLY PRN
Qty: 30 TABLET | Refills: 0 | Status: SHIPPED | OUTPATIENT
Start: 2024-09-05 | End: 2024-11-04

## 2024-09-05 RX ORDER — TRIAMCINOLONE ACETONIDE 40 MG/ML
40 INJECTION, SUSPENSION INTRA-ARTICULAR; INTRAMUSCULAR ONCE
Status: COMPLETED | OUTPATIENT
Start: 2024-09-05 | End: 2024-09-05

## 2024-09-05 RX ORDER — METHYLPREDNISOLONE 4 MG/1
TABLET ORAL
Qty: 21 TABLET | Refills: 0 | Status: SHIPPED | OUTPATIENT
Start: 2024-09-05 | End: 2024-09-12

## 2024-09-05 ASSESSMENT — ENCOUNTER SYMPTOMS
SHORTNESS OF BREATH: 0
FEVER: 0
FATIGUE: 0
NERVOUS/ANXIOUS: 0
COUGH: 0
AGITATION: 0
CONSTIPATION: 0
DIARRHEA: 0
NAUSEA: 0
VOMITING: 0
SLEEP DISTURBANCE: 0
WEAKNESS: 0

## 2024-09-05 NOTE — PROGRESS NOTES
Subjective   Patient ID: Jaclyn Calero is a 63 y.o. female who presents for Shoulder Pain (Left side - x2wks/Thinks it may be to the disc in her back she has issues with - had this yrs ago on R/side/Has numb/ting all down arm to finger tips - some issues when grasping /Hand swelling).    Has new onset Lt shoulder pain that radiates down her arm,she has some numbness and tingling to that arm also.  She reports she has had this in the past and had CT that showed some cervical spine disc degeneration.          Review of Systems   Constitutional:  Negative for fatigue and fever.   Respiratory:  Negative for cough and shortness of breath.    Cardiovascular:  Negative for chest pain and leg swelling.   Gastrointestinal:  Negative for constipation, diarrhea, nausea and vomiting.   Skin:  Negative for pallor and rash.   Neurological:  Negative for weakness.   Psychiatric/Behavioral:  Negative for agitation, behavioral problems and sleep disturbance. The patient is not nervous/anxious.        Objective   /89   Pulse 72   Temp 36.2 °C (97.2 °F)   Wt 105 kg (231 lb 8 oz)   BMI 38.52 kg/m²     Physical Exam  Vitals and nursing note reviewed.   Constitutional:       General: She is not in acute distress.  HENT:      Head: Normocephalic and atraumatic.   Eyes:      Pupils: Pupils are equal, round, and reactive to light.   Pulmonary:      Effort: Pulmonary effort is normal.   Musculoskeletal:         General: No swelling or tenderness.      Comments: Decreased ROM to LT arm   Skin:     General: Skin is warm and dry.   Neurological:      Mental Status: She is alert and oriented to person, place, and time.   Psychiatric:         Mood and Affect: Mood normal.         Assessment/Plan   Problem List Items Addressed This Visit             ICD-10-CM    Osteoarthritis of cervical spine M47.812    Relevant Medications    triamcinolone acetonide (Kenalog-40) injection 40 mg (Start on 9/5/2024  9:30 AM)    cyclobenzaprine (Flexeril)  5 mg tablet    Other Relevant Orders    XR cervical spine 2-3 views    Referral to Physical Therapy     Other Visit Diagnoses         Codes    Pain    -  Primary R52    Relevant Medications    triamcinolone acetonide (Kenalog-40) injection 40 mg (Start on 9/5/2024  9:30 AM)    methylPREDNISolone (Medrol Dospak) 4 mg tablets    Other Relevant Orders    XR cervical spine 2-3 views    Acute pain of left shoulder     M25.512    Relevant Medications    triamcinolone acetonide (Kenalog-40) injection 40 mg (Start on 9/5/2024  9:30 AM)    methylPREDNISolone (Medrol Dospak) 4 mg tablets    cyclobenzaprine (Flexeril) 5 mg tablet    Other Relevant Orders    Referral to Physical Therapy

## 2024-09-06 DIAGNOSIS — E11.8 TYPE 2 DIABETES MELLITUS WITH COMPLICATION, WITHOUT LONG-TERM CURRENT USE OF INSULIN (MULTI): ICD-10-CM

## 2024-09-06 RX ORDER — METFORMIN HYDROCHLORIDE 500 MG/1
TABLET, EXTENDED RELEASE ORAL
Qty: 360 TABLET | Refills: 3 | Status: SHIPPED | OUTPATIENT
Start: 2024-09-06

## 2024-09-06 NOTE — TELEPHONE ENCOUNTER
"[facility] [place] for refill    Valentine Calero \"Jaclyn\"  [place] [address] Clinical Support Staff  Phone Number: [phone]     Hi   I need a refill on my metformin xr 500mg bid. Called into [facility] . Thanks Have a nice weekend. Thanks Jaclyn  "

## 2024-09-09 ENCOUNTER — TELEPHONE (OUTPATIENT)
Dept: PRIMARY CARE | Facility: CLINIC | Age: 64
End: 2024-09-09
Payer: COMMERCIAL

## 2024-09-09 NOTE — TELEPHONE ENCOUNTER
----- Message from Vidya Cantu sent at 9/9/2024  1:29 PM EDT -----  X ray shows some age related wear and tear as well as bone spurs (osteophytes), physical therapy may help

## 2024-10-02 ENCOUNTER — APPOINTMENT (OUTPATIENT)
Dept: PHYSICAL THERAPY | Facility: CLINIC | Age: 64
End: 2024-10-02
Payer: COMMERCIAL

## 2024-10-09 ENCOUNTER — EVALUATION (OUTPATIENT)
Dept: PHYSICAL THERAPY | Facility: CLINIC | Age: 64
End: 2024-10-09
Payer: COMMERCIAL

## 2024-10-09 DIAGNOSIS — M47.812 OSTEOARTHRITIS OF CERVICAL SPINE, UNSPECIFIED SPINAL OSTEOARTHRITIS COMPLICATION STATUS: ICD-10-CM

## 2024-10-09 DIAGNOSIS — M25.512 ACUTE PAIN OF LEFT SHOULDER: ICD-10-CM

## 2024-10-09 PROCEDURE — 97110 THERAPEUTIC EXERCISES: CPT | Mod: GP

## 2024-10-09 PROCEDURE — 97162 PT EVAL MOD COMPLEX 30 MIN: CPT | Mod: GP

## 2024-10-09 NOTE — PROGRESS NOTES
"Physical Therapy    Physical Therapy Evaluation    Patient Name: Valentine Calero \"Jaclyn\"  MRN: [id number]  Today's Date: 10/9/2024   confirmed verbally by patient.    Time Entry:   Time Calculation  Start Time: 1200  Stop Time: 1245  Time Calculation (min): 45 min  PT Evaluation Time Entry  PT Evaluation (Moderate) Time Entry: 30  PT Therapeutic Procedures Time Entry  Therapeutic Exercise Time Entry: 15                    Reason for Visit  Referred by: Vidya Cantu   Referral DX: Acute pain of left shoulder [M25.512], Osteoarthritis of cervical spine, unspecified spinal osteoarthritis complication status [M47.812]     Insurance:  Visit: #1  Authorized: to be determined  Payor/Plan: [employer]   / 0% coins, no ded, $7350 oop ($1355.08 met), $30 copay, 20v donna yr (0v used as of 2024), no PA      Current Problem  1. Acute pain of left shoulder  Referral to Physical Therapy      2. Osteoarthritis of cervical spine, unspecified spinal osteoarthritis complication status  Referral to Physical Therapy          Subjective   Date of Onset: \"one year ago\"  Chief Complaint: Cervical Radiculopathy (LUE) and L shoulder pain     Patient presents to physical therapy with left sided shoulder pain and cervical radiculopathy symptoms. She experiences n/t down the left UE all the way down into her fingers (more so 1-3) on both sides of the hand. She has previously been through a bout of physical therapy for similar symptoms on the RUE that has not bothered her since. There is no prior MVA or JORDIN that would have caused this injury to have initially occurred.     In regard to her current treatment to this point, she was using a steroid pack on the cervical spine that did provide some relief, but once she stopped using it the symptoms returned to their previous intensity and frequency.     Patient reports having trouble sleeping at night - normally a side sleeper. Reports use of specific neck pillow that " helps to keep her head flat without rotating either direction or flexing/extending.     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 She has been able to self manage her symptoms but by the end of the day there is an increase in cervical and shoulder pain.       Functional limitations: ADLs (reaching, pulling, pushing); transferring patient's for job; dressing/bathing while using LUE    Work status/occupation: nurse at memory care unit       Pain:   0/10 at rest (cervical spine) ; 2-3/10 in neck when driving  Achy sensation in the cervical spine. Works 12 hour shifts and by the end of her shift her neck was hurting more than usual.   Localized pain in the shoulder with active movements.         Recent Imaging:  XR of cervical spine (9/05/2024)  Nonspecific straightening of normal cervical lordosis which can be associated with patient positioning or muscle spasm. Moderate spondylosis from C4-5 to C6-7 with disc height loss and osteophytes.  Mild spondylosis at C3-4.        Reviewed medical screening history form with patient: Yes,         Barriers Impacting Care:  Patient works as a nurse that requires her to perform upright active movements for long durations (12 hour shifts), which could prolong rehab progression.   Patient notes lack of confidence in holding items with her left hand because she feels as though she is going to drop it, despite her not having dropped anything to this point.     Precautions:   Patient likely has intra-articular left shoulder dysfunction in conjunction with nerve root compression impacting the LUE.           Objective   Posture:   Static sitting/standing = mod rounded shoulders with forward head; increased thoracic kyphosis mod-severe    VBI Screen:  Patient denies diplopia, dizziness, drop attacks, dysarthria, dysphagia, nystagmus, nausea and numbness at this time.  Supine rotation to end range left  "and right with 10 sec hold negative    Cervical AROM  Flexion: 23 A  Extension: 21 A  Rotation L/R: 47 A / 44 A  Side Bending L/R: 14 A / 18 A    Shoulder AROM   Flexion   L = 110 A, 135 P with empty end-feel   R = 110 A, 145 P with firm end-feel   90/90 ER  L = 35 A, 38 P with empty end-feel   R = not tested  90/90 IR  L = 25 A, 30 P with empty end-feel   R = not tested     Cervical Myotomes (L/R)  C4 Scapular elevation: 5/5, 5/5  C5 Shoulder abduction: 5/5, 5/5  C6 Elbow flexion: 4-/5, 4+/5  C6 Wrist extension: 4-/5, 4+/5  C7 Elbow extension: 4-/5, 4/5  C7 Wrist flexion: 4-/5, 4/5  C8 thumb extension: 4+/5 , 4+/5  T1 finger abduction: 4-/5 , 4+/5     UE Dermatomes: intact B      DTR (L/R)  Unable to elicit UE DTRs due to patient maintaining constant muscle tension throughout examination of the elbow and shoulder     STRENGTH  Shoulder ER (L) in neutral = 4-/5 with pain    Shoulder ER (L) in 90/90 = 3/5 with pain     Shoulder IR (L) in neutral = 4-/5 with pain     Shoulder IR in 90/90 = 4-/5 with pain     Special Tests  CERVICAL  (+) distraction   (-) valsalva     SHOULDER   Impingement  Hawkin's Jose (+)  RTC  Infraspinatus MMT (+)  Instability  Apprehension (+)  SLAP   Apprehension (+)  O'amador's (+)  Biceps Load Test (+)  Speed's Test (+)     Palpation:   TTP = subscapularis mm belly, supraspinatus tendon distally, biceps mm belly and proximal tendon      \"Key Sign\" = AROM into C/S left rotation + SB ; L shoulder flexion       TREATMENT  Educated patient on course of treatment.     THERAPEUTIC EXERCISE:   Introduced:  Banded IR = 10x (yellow band)  Banded IR with 90 flexion press = 2x10 (yellow band)  Shoulder alphabet on mirror = 1x  Extension SNAGs = 10x  Cervical flexion isometric = 1x10x3\" hold each time     OP Education: HEP: Patient verbalizes and demonstrates understanding of home exercises. Will contact writer if with questions or if condition worsens. Discussed       Outcome Measures:    QuickDASH = " 34% disability   NDI = 30% disability     Assessment  PT Diagnosis: Patient presents with signs and symptoms consistent with intra-articular GHJ and rotator cuff dysfunction as well as lower cervical nerve root compression. She tolerates the introduction of interventions performed in session with only SNAGs causing additional n/t irritation in the LUE. Skilled PT required to return to prior level of function and maximize functional outcome. Likely to benefit from skilled care.      Plan  Therapeutic exercises to improve ROM and strength of cervical spine, shoulder, elbow, wrist, forearm and periscapular musculature; neuromuscular re-education to promote proper engagement of cervical spine, shoulder, elbow, forearm, wrist, and periscapular musculature; manual therapy for joint mobility and soft tissue tightness; therapeutic activity to promote return to prior level of function; Dry needling; Cold therapy; Biofeedback;  Patient plan will consist of 2 days/week for 12 weeks.    Next Visit Plan:  Review HEP and compliance. Perform distraction to cervical spine while assessing  strength before and after to confirm positive effects of distraction depsite subjective symptom relief. Promote anterior shoulder stabilization.      Goals:  Patient will be independent with HEP.  Patient will report improvement in QuickDASH outcome measure <30% disability in 4 weeks.   Patient will report improvement in QuickDASH outcome measure <20% disability in 8 weeks.   Patient will report improvement in NDI outcome measure <25% disability in 4 weeks.   Patient will report improvement in NDI outcome measure <15% disability in 8 weeks.   Patient will demonstrate at least 30 degrees of cervical flexion and extension in 4 weeks to reflect positive changes in cervical AROM.   Patient will demonstrate at least 40 degrees of cervical flexion and extension in 8 weeks to reflect positive changes in cervical AROM.   Patient will demonstrate at  least 130 degrees of shoulder flexion AROM with less than 4/10 pain in 4 weeks to reflect improvements in AROM.   Patient will demonstrate at least 4-/5 strength in IR and ER MMT in neutral position with less than 4/10 pain in 4 weeks.   Patient will demonstrate at least 4/5 strength in IR and ER MMT in neutral position with less than 4/10 pain in 8 weeks.   Patient will demonstrate the ability to lift a 5# item from waist height to shoulder level height without pain in 8 weeks.     Yogi Valdez PT, DPT

## 2024-10-10 DIAGNOSIS — M25.512 ACUTE PAIN OF LEFT SHOULDER: Primary | ICD-10-CM

## 2024-10-14 ENCOUNTER — HOSPITAL ENCOUNTER (OUTPATIENT)
Dept: RADIOLOGY | Facility: CLINIC | Age: 64
Discharge: HOME | End: 2024-10-14
Payer: COMMERCIAL

## 2024-10-14 ENCOUNTER — TREATMENT (OUTPATIENT)
Dept: PHYSICAL THERAPY | Facility: CLINIC | Age: 64
End: 2024-10-14
Payer: COMMERCIAL

## 2024-10-14 DIAGNOSIS — M47.812 OSTEOARTHRITIS OF CERVICAL SPINE, UNSPECIFIED SPINAL OSTEOARTHRITIS COMPLICATION STATUS: Primary | ICD-10-CM

## 2024-10-14 DIAGNOSIS — M25.512 ACUTE PAIN OF LEFT SHOULDER: ICD-10-CM

## 2024-10-14 PROCEDURE — 97110 THERAPEUTIC EXERCISES: CPT | Mod: GP

## 2024-10-14 PROCEDURE — 73030 X-RAY EXAM OF SHOULDER: CPT | Mod: LEFT SIDE | Performed by: RADIOLOGY

## 2024-10-14 PROCEDURE — 73030 X-RAY EXAM OF SHOULDER: CPT | Mod: LT

## 2024-10-14 PROCEDURE — 97140 MANUAL THERAPY 1/> REGIONS: CPT | Mod: GP

## 2024-10-14 NOTE — PROGRESS NOTES
"Physical Therapy    Physical Therapy Treatment    Patient Name: Valentine Calero \"Jaclyn\"  MRN: [id number]  Today's Date: 10/14/2024  Time Calculation  Start Time: 1145  Stop Time: 1230  Time Calculation (min): 45 min        Insurance:  Visit: #2  Authorized: to be determined  Plan of care: 10/9/2024 - 1/7/2025   Payor: [facility] / Plan: [facility] / Product Type: *No Product type* /     Subjective:  Patient reports to physical therapy with complaints of worsening shoulder pain the night of the evaluation and then the shoulder symptoms were on and off worse-better throughout the rest of the week. Pain presented itself as sharp localized pain, making the patient discouraged that she had done additional damage to the shoulder.     Patient did obtain XR of left shoulder prior to session, but is awaiting the results to determine if further structural damage exists. Patient is encouraged and motivated to continue with her rehab progression to return to prior level of function.     Current pain:  Neck = 1-2/10  L shoulder = 5-6/10     Objective:  Cervical Spine   Joint mobility = 1+ (C3-5 TP)    Shoulder PROM (supine)  Flexion = 135 with empty end-feel     Shoulder AROM   Flexion = 122 degrees with empty end-feel   Abduction = 95 degrees with pain at end-range       Treatment:  Manual Therapy = 1 unit  PA TP C3-5 grade 3 oscillatory mob   Manual distraction with manual chin tuck x10   Better n/t symptoms  Pin and stretch to lats (LUE) with passive shoulder flexion x10    Therapeutic Exercise = 2 units  Towel raised chin tucks = 3x10 (4 towels folded in half and stacked)  Prone scap retractions = 2x10  Shoulder AROM against wall   Flexion slides = 3x5  Abduction / wall angels = 3x5   Extension SNAGs = x10  Seated thoracic extensions = 3x5        OP Education: HEP:   Towel raised chin tucks = 3x10 (4 towels folded in half and stacked)  Prone scap retractions = 2x10  Shoulder AROM against wall "   Flexion slides = 3x5  Abduction / wall angels = 3x5   Extension SNAGs = x10  Seated thoracic extensions = 3x5    Diagnosis:  1. Osteoarthritis of cervical spine, unspecified spinal osteoarthritis complication status    2. Acute pain of left shoulder        Assessment:   Pt demonstrates positive response to manual distraction and chin tuck elevating the cervical spine into flexion using towels. She has no worsening of symptoms with use of extension SNAGs as well as thoracic extensions in sitting. Cueing provided for proper mechanics into flexion-based towel slides and wall angels, resulting in shoulder fatigue rather than additional shoulder pain.      Plan:  Continue to progress as tolerated.    Yogi Valdez, PT

## 2024-10-21 ENCOUNTER — TREATMENT (OUTPATIENT)
Dept: PHYSICAL THERAPY | Facility: CLINIC | Age: 64
End: 2024-10-21
Payer: COMMERCIAL

## 2024-10-21 DIAGNOSIS — M25.512 ACUTE PAIN OF LEFT SHOULDER: ICD-10-CM

## 2024-10-21 DIAGNOSIS — M47.812 OSTEOARTHRITIS OF CERVICAL SPINE, UNSPECIFIED SPINAL OSTEOARTHRITIS COMPLICATION STATUS: ICD-10-CM

## 2024-10-21 PROCEDURE — 97110 THERAPEUTIC EXERCISES: CPT | Mod: GP

## 2024-10-21 PROCEDURE — 97112 NEUROMUSCULAR REEDUCATION: CPT | Mod: GP

## 2024-10-21 NOTE — PROGRESS NOTES
"Physical Therapy    Physical Therapy Treatment    Patient Name: Valentine Calero \"Jaclyn\"  MRN: [id number]  Today's Date: 10/21/2024  Time Calculation  Start Time: 1147  Stop Time: 1233  Time Calculation (min): 46 min        Insurance:  Visit: #3  Authorized: to be determined  Plan of care: 10/9/2024 - 1/7/2025   Payor: [facility] / Plan: [facility] / Product Type: *No Product type* /     Subjective:  Patient reports to physical therapy with improvements in cervical spine related symptoms, noting that she has not noticed neck pain over the last week from last session into today. Also, she has not had any n/t symptoms in her LUE over the last week with the increase in rehab exercises. The only things that seem to provoke any additional n/t symptoms are when she is doing things for an extended period of time, especially at the end of a work day.     Patient notes the front of the shoulder has been increasing in symptoms (achiness sensation) after completing the HEP. This is not a sharp or stabbing pain in the shoulder, simply achiness.       Current pain:  Neck = 0/10  L shoulder = 0-4/10     Objective:  Pre-intervention:   Shoulder AROM   Flexion = 76 degrees with empty end-feel   Abduction = 95 degrees with pain at end-range     Post intervention  Shoulder AROM  Flexion = 132 degrees with pain at end-range       Treatment:  Manual Therapy = 0 unit  STM to biceps mm belly   ART to biceps mm belly with active elbow flex/ext x10    Therapeutic Exercise = 2 units  Shoulder AROM against wall   Flexion slides = 3x5  Abduction / wall angels = 3x5   Extension SNAGs = x10  Seated thoracic extensions = 3x5  Seated scap retractions = 2x10  Towel raised chin tucks = 2x20 (4 towels folded in half and stacked)      3. Neuromuscular Re-education = 1 unit  Manual nerve glides   Median x10  Musculocutaneous x10  Median nerve glides = x10        OP Education: HEP:   Towel raised chin tucks = 3x10 (4 towels " folded in half and stacked)  Prone scap retractions = 2x10  Shoulder AROM against wall   Flexion slides = 3x5  Abduction / wall angels = 3x5   Extension SNAGs = x10  Seated thoracic extensions = 3x5    Diagnosis:  1. Acute pain of left shoulder    2. Osteoarthritis of cervical spine, unspecified spinal osteoarthritis complication status        Assessment:   Pt demonstrates positive response to manual therapy to the biceps muscle belly as well as nerve glides to median and musculocutaneous nerves, showing an increase in nearly 50 degrees of shoulder motion post with reductions in pain levels. Frequent cueing for proper shoulder position during shoulder flexion and wall angles to enforce shoulder specific movement and reduce upper trapezius activation and lateral bending to improve AROM.     Plan:  Continue to progress as tolerated.    Yogi Valdez, PT

## 2024-10-28 ENCOUNTER — TREATMENT (OUTPATIENT)
Dept: PHYSICAL THERAPY | Facility: CLINIC | Age: 64
End: 2024-10-28
Payer: COMMERCIAL

## 2024-10-28 DIAGNOSIS — M47.812 OSTEOARTHRITIS OF CERVICAL SPINE, UNSPECIFIED SPINAL OSTEOARTHRITIS COMPLICATION STATUS: ICD-10-CM

## 2024-10-28 DIAGNOSIS — M25.512 ACUTE PAIN OF LEFT SHOULDER: ICD-10-CM

## 2024-10-28 PROCEDURE — 97112 NEUROMUSCULAR REEDUCATION: CPT | Mod: GP

## 2024-10-28 PROCEDURE — 97140 MANUAL THERAPY 1/> REGIONS: CPT | Mod: GP

## 2024-11-06 ENCOUNTER — APPOINTMENT (OUTPATIENT)
Dept: PHYSICAL THERAPY | Facility: CLINIC | Age: 64
End: 2024-11-06
Payer: COMMERCIAL

## 2024-11-11 ENCOUNTER — APPOINTMENT (OUTPATIENT)
Dept: PRIMARY CARE | Facility: CLINIC | Age: 64
End: 2024-11-11
Payer: COMMERCIAL

## 2024-11-12 ENCOUNTER — TREATMENT (OUTPATIENT)
Dept: PHYSICAL THERAPY | Facility: CLINIC | Age: 64
End: 2024-11-12
Payer: COMMERCIAL

## 2024-11-12 DIAGNOSIS — M25.512 ACUTE PAIN OF LEFT SHOULDER: ICD-10-CM

## 2024-11-12 DIAGNOSIS — M47.812 OSTEOARTHRITIS OF CERVICAL SPINE, UNSPECIFIED SPINAL OSTEOARTHRITIS COMPLICATION STATUS: ICD-10-CM

## 2024-11-12 PROCEDURE — 97110 THERAPEUTIC EXERCISES: CPT | Mod: GP

## 2024-11-12 PROCEDURE — 97140 MANUAL THERAPY 1/> REGIONS: CPT | Mod: GP

## 2024-11-12 PROCEDURE — 97112 NEUROMUSCULAR REEDUCATION: CPT | Mod: GP

## 2024-11-12 NOTE — PROGRESS NOTES
"Physical Therapy    Physical Therapy Treatment    Patient Name: Valentine Calero \"Jaclyn\"  MRN: [id number]  Today's Date: 11/12/2024  Time Calculation  Start Time: 1145  Stop Time: 1230  Time Calculation (min): 45 min        Insurance:  Visit: #5  Authorized: to be determined  Plan of care: 10/9/2024 - 1/7/2025   Payor: [facility] / Plan: [facility] / Product Type: *No Product type* /     Subjective:  Patient reports to physical therapy with complete elimination of cervical and left shoulder pain over the last two weeks since previous session. Patient is encouraged by her positive progress up to this point and is motivated to continue further to maintain the progress she has accomplished.         Current pain:  Neck = 0/10  L shoulder = 0/10         Objective:  Shoulder AROM   Flexion = 140 degrees  Abduction = 122 degrees     Cervical AROM  Rotation   R = 35 degrees  L = 40 degrees      QuickDASH = 18 (total score) ; 15.91% disability   NDI = 4% disability           Treatment:  Manual Therapy = 1 unit  Suboccipital release = 2x5x10\" holds   Manual distraction grade 2 with chin tuck grade 2 oscillatory     Therapeutic Exercise = 1 units  Sidelying open books = 10x each side   Extension SNAGs = 2x10   Green theraband high row pull = 3x15       3. Neuromuscular Re-education = 1 unit  Supine chin tucks = 2x10 (2 towels folded in half behind head)  Nerve glides = 2x10  Median and musculocutaneous  Standing chin tucks = 2x10  Against the wall   Standing wall angles (into abduction) with chin tuck against wall = 2x10 each side         OP Education: HEP: 3 ---- Access Code: [email or web address]  - Supine Cervical Retraction with Towel  - 1 x daily - 7 x weekly - 2 sets - 10 reps - 10 seconds hold  - Sidelying Thoracic Rotation with Open Book  - 1 x daily - 7 x weekly - 2 sets - 10 reps  - Cervical Retraction at Wall  - 1 x daily - 7 x weekly - 2 sets - 10 reps - 10 seconds hold  - Mid-Lower Cervical " Extension SNAG with Strap  - 1 x daily - 7 x weekly - 3 sets - 10 reps  - Standing High Row with Anchored Resistance  - 1 x daily - 7 x weekly - 3 sets - 15 reps  - Wall [place]  - 1 x daily - 7 x weekly - 2 sets - 15 reps        Diagnosis:  1. Acute pain of left shoulder    2. Osteoarthritis of cervical spine, unspecified spinal osteoarthritis complication status        Assessment:   Pt's elimination of cervical and shoulder pain from positive improvements in HEP compliance over the last two weeks has enabled the introduction of new interventions in session for posture and deep neck flexor coordination in varying positions. Prior to starting interventions, patient has nearly 2x the AROM into shoulder flexion compared to previous measurements as a result of pain reduction. Patient provided updated HEP to perform every day until next session, where consideration will be taken towards discharge if patient continues to be pain-free. Upon completion of QUICKDASH and NDI, patient has demonstrated large improvements (QuickDash from 34% to 15.91% ; NDI 30% disability to 4%).         Plan:  Continue to progress as tolerated.      Goals:  Patient will be independent with HEP.  MET  Patient will report improvement in QuickDASH outcome measure <30% disability in 4 weeks.   MET   Patient will report improvement in QuickDASH outcome measure <20% disability in 8 weeks.   MET   Patient will report improvement in NDI outcome measure <25% disability in 4 weeks.   MET   Patient will report improvement in NDI outcome measure <15% disability in 8 weeks.   MET   Patient will demonstrate at least 30 degrees of cervical flexion and extension in 4 weeks to reflect positive changes in cervical AROM.   80% MET   Patient will demonstrate at least 40 degrees of cervical flexion and extension in 8 weeks to reflect positive changes in cervical AROM.   70% MET   Patient will demonstrate at least 130 degrees of shoulder flexion AROM with less than  4/10 pain in 4 weeks to reflect improvements in AROM.   MET   Patient will demonstrate at least 4-/5 strength in IR and ER MMT in neutral position with less than 4/10 pain in 4 weeks.   60% MET   Patient will demonstrate at least 4/5 strength in IR and ER MMT in neutral position with less than 4/10 pain in 8 weeks.   40% MET   Patient will demonstrate the ability to lift a 5# item from waist height to shoulder level height without pain in 8 weeks.       Yogi Valdez, PT

## 2024-11-20 ENCOUNTER — TREATMENT (OUTPATIENT)
Dept: PHYSICAL THERAPY | Facility: CLINIC | Age: 64
End: 2024-11-20
Payer: COMMERCIAL

## 2024-11-20 DIAGNOSIS — M25.512 ACUTE PAIN OF LEFT SHOULDER: ICD-10-CM

## 2024-11-20 DIAGNOSIS — M47.812 OSTEOARTHRITIS OF CERVICAL SPINE, UNSPECIFIED SPINAL OSTEOARTHRITIS COMPLICATION STATUS: ICD-10-CM

## 2024-11-20 PROCEDURE — 97112 NEUROMUSCULAR REEDUCATION: CPT | Mod: GP

## 2024-11-20 PROCEDURE — 97110 THERAPEUTIC EXERCISES: CPT | Mod: GP

## 2024-11-20 NOTE — PROGRESS NOTES
"Physical Therapy    Physical Therapy Treatment    Patient Name: Valentine Calero \"Jaclyn\"  MRN: [id number]  Today's Date: 11/20/2024  Time Calculation  Start Time: 1433  Stop Time: 1515  Time Calculation (min): 42 min        Insurance:  Visit: #6  Authorized: 20  Plan of care: 10/9/2024 - 1/7/2025   Payor: [facility] / Plan: [facility] / Product Type: *No Product type* /     Subjective:  Patient reports to physical therapy with continued elimination of shoulder and cervical symptoms from previous session. She specifically notes that the previous intervention when performing a chin tuck combined with a wall sabrina re-created her anterior shoulder pain, requesting to modify or eliminate intervention. At this time, due to patient elimination of cervical pain there is more emphasis on improving symptom and function of the left shoulder.         Current pain:  Neck = 0/10  L shoulder = 0/10         Objective:  Shoulder AROM   Functional IR = T7 bilat  Functional ER =   (L) T1 (with anterior shoulder pain)  (R) T3          Treatment:  Therapeutic Exercise = 2 units  Strap assisted IR stretch = 5x10\" holds  Shoulder IR (GTB) = 2x15   Shoulder ER (GTB) = 2x15   GTB standing row = x15       3. Neuromuscular Re-education = 1 unit  Supine horizontal abduction with overhead shoulder flexion = 10x  Created worsening of anterior shoulder pain = eliminated intervention   Supine horizontal adduction with overhead flexion = 2x10  Standing horizontal adduction with overhead flexion = x10 (GTB)      OP Education: HEP: 3 ---- Access Code: [email or web address]  - Supine Cervical Retraction with Towel  - 1 x daily - 7 x weekly - 2 sets - 10 reps - 10 seconds hold  - Mid-Lower Cervical Extension SNAG with Strap  - 1 x daily - 7 x weekly - 3 sets - 10 reps  - Standing Shoulder Horizontal Adduction with Anchored Resistance  - 1 x daily - 7 x weekly - 3 sets - 10 reps  - Shoulder Internal Rotation with Resistance  - 1 x daily - " 7 x weekly - 2 sets - 15 reps  - Shoulder External Rotation with Anchored Resistance  - 1 x daily - 7 x weekly - 1 sets - 5 reps - 10 seconds hold  - Standing High Row with Anchored Resistance  - 1 x daily - 7 x weekly - 2 sets - 12 reps        Diagnosis:  1. Acute pain of left shoulder    2. Osteoarthritis of cervical spine, unspecified spinal osteoarthritis complication status        Assessment:   Pt's elimination of cervical pain resulted in introduction of shoulder specific exercises to strengthen the RTC beginning in the neutral shoulder position (IR/ER). Horizontal adduction with GTB utilized in standing > supine to encourage anterior stability of the shoulder with overhead movements and reduce IR flare compensation. Patient provided updated HEP at end of session, confirming her confidence to perform all interventions at home. Patient denies worsening of shoulder symptoms at the end of session with only provoking symptoms during specific exercises (ER with band).         Plan:  Continue to progress as tolerated.      Goals:  Patient will be independent with HEP.  MET  Patient will report improvement in QuickDASH outcome measure <30% disability in 4 weeks.   MET   Patient will report improvement in QuickDASH outcome measure <20% disability in 8 weeks.   MET   Patient will report improvement in NDI outcome measure <25% disability in 4 weeks.   MET   Patient will report improvement in NDI outcome measure <15% disability in 8 weeks.   MET   Patient will demonstrate at least 30 degrees of cervical flexion and extension in 4 weeks to reflect positive changes in cervical AROM.   80% MET   Patient will demonstrate at least 40 degrees of cervical flexion and extension in 8 weeks to reflect positive changes in cervical AROM.   75% MET   Patient will demonstrate at least 130 degrees of shoulder flexion AROM with less than 4/10 pain in 4 weeks to reflect improvements in AROM.   MET   Patient will demonstrate at least 4-/5  strength in IR and ER MMT in neutral position with less than 4/10 pain in 4 weeks.   65% MET   Patient will demonstrate at least 4/5 strength in IR and ER MMT in neutral position with less than 4/10 pain in 8 weeks.   40% MET   Patient will demonstrate the ability to lift a 5# item from waist height to shoulder level height without pain in 8 weeks.       Yogi Valdez, PT

## 2024-11-24 DIAGNOSIS — E78.2 MIXED HYPERLIPIDEMIA: ICD-10-CM

## 2024-11-25 RX ORDER — ROSUVASTATIN CALCIUM 5 MG/1
5 TABLET, COATED ORAL DAILY
Qty: 90 TABLET | Refills: 3 | Status: SHIPPED | OUTPATIENT
Start: 2024-11-25

## 2024-12-02 DIAGNOSIS — I10 PRIMARY HYPERTENSION: ICD-10-CM

## 2024-12-02 DIAGNOSIS — F41.1 GENERALIZED ANXIETY DISORDER: ICD-10-CM

## 2024-12-02 RX ORDER — LISINOPRIL 40 MG/1
40 TABLET ORAL
Qty: 90 TABLET | Refills: 3 | Status: SHIPPED | OUTPATIENT
Start: 2024-12-02

## 2024-12-02 RX ORDER — PAROXETINE HYDROCHLORIDE 20 MG/1
20 TABLET, FILM COATED ORAL DAILY
Qty: 90 TABLET | Refills: 3 | Status: SHIPPED | OUTPATIENT
Start: 2024-12-02

## 2024-12-04 ENCOUNTER — APPOINTMENT (OUTPATIENT)
Dept: PHYSICAL THERAPY | Facility: CLINIC | Age: 64
End: 2024-12-04
Payer: COMMERCIAL

## 2024-12-04 DIAGNOSIS — M47.812 OSTEOARTHRITIS OF CERVICAL SPINE, UNSPECIFIED SPINAL OSTEOARTHRITIS COMPLICATION STATUS: ICD-10-CM

## 2024-12-04 DIAGNOSIS — M25.512 ACUTE PAIN OF LEFT SHOULDER: ICD-10-CM

## 2024-12-04 PROCEDURE — 97140 MANUAL THERAPY 1/> REGIONS: CPT | Mod: GP

## 2024-12-04 PROCEDURE — 97110 THERAPEUTIC EXERCISES: CPT | Mod: GP

## 2024-12-04 NOTE — PROGRESS NOTES
"Physical Therapy    Physical Therapy Treatment    Patient Name: Valentine Calero \"Jaclyn\"  MRN: [id number]  Today's Date: 12/4/2024  Time Calculation  Start Time: 0945  Stop Time: 1015  Time Calculation (min): 30 min        Insurance:  Visit: #7  Authorized: 20  Plan of care: 10/9/2024 - 1/7/2025   Payor: [facility] / Plan: [facility] / Product Type: *No Product type* /     Subjective:  Patient reports to physical therapy with worsening of shoulder symptoms after completion of last session lending to the need for use of hot pack and additional pain relief interventions. However, the pain was completely eliminated within 48 hours lending to potential need to reduce overall volume and modification of exercise performance. She continues to confirm that her neck related symptoms have not returned since the positive progression she previously experienced with therapy.         Current pain:  Neck = 0/10  L shoulder = 2/10         Objective:  Shoulder AAROM (supine)  Pre-intervention   Flexion = 110 with pain   Abduction = 90 with pain   Post intervention   Flexion = 135 degrees before pain begins   Abduction = 115 degrees before pain begins     (-) ULTT-1        Treatment:  Therapeutic Exercise = 1 unit  Shoulder IR (GTB) = 2x5  Shoulder horizontal adduction with flexion (pain-free ROM) = 2x5 (GTB)  Shoulder ER (GTB) = 2x5x5\"     2. Manual therapy = 1 unit  AP grade 1 oscillatory in open pack position   MWM   Inferior grade 2 mob into abduction with superior grade 1 back into adduction = 2x10  STM to bicep mm belly (short and long head) as well as proximal tendon           OP Education: HEP: 3 ---- Access Code: [email or web address]  - Supine Cervical Retraction with Towel  - 1 x daily - 7 x weekly - 2 sets - 10 reps - 10 seconds hold  - Mid-Lower Cervical Extension SNAG with Strap  - 1 x daily - 7 x weekly - 3 sets - 10 reps  - Standing Shoulder Horizontal Adduction with Anchored Resistance  - 1 x daily - 7 " x weekly - 2 sets - 5 reps  - Shoulder Internal Rotation with Resistance  - 1 x daily - 7 x weekly - 2 sets - 5 reps  - Shoulder External Rotation with Anchored Resistance  - 1 x daily - 7 x weekly - 1 sets - 5 reps - 5 seconds hold  - Standing High Row with Anchored Resistance  - 1 x daily - 7 x weekly - 2 sets - 12 reps        Diagnosis:  1. Acute pain of left shoulder    2. Osteoarthritis of cervical spine, unspecified spinal osteoarthritis complication status          Assessment:   Pt responds well to manual therapy to the GHJ focusing on low grade oscillatory movements in open pack position as well as in abduction. STM to bicep helps to reduce pain response with AAROM into abduction from pre to post. Interventions as part of HEP were reduced in volume from 10 reps to 5 reps for IR, ER, and overhead flexion from increased pain response last session. This helped the patient to have less pain by the end of the session compared to the previous session with still obtaining muscular fatigue and activation with exercises. Patient educated on consideration for discussion with ortho if shoulder-related pain does not improve in the next 4-6 weeks due to current limitations in ROM, strength, and functional capacity.         Plan:  Continue to progress as tolerated.      Goals:  Patient will be independent with HEP.  MET  Patient will report improvement in QuickDASH outcome measure <30% disability in 4 weeks.   MET   Patient will report improvement in QuickDASH outcome measure <20% disability in 8 weeks.   MET   Patient will report improvement in NDI outcome measure <25% disability in 4 weeks.   MET   Patient will report improvement in NDI outcome measure <15% disability in 8 weeks.   MET   Patient will demonstrate at least 30 degrees of cervical flexion and extension in 4 weeks to reflect positive changes in cervical AROM.   85% MET   Patient will demonstrate at least 40 degrees of cervical flexion and extension in 8  weeks to reflect positive changes in cervical AROM.   80% MET   Patient will demonstrate at least 130 degrees of shoulder flexion AROM with less than 4/10 pain in 4 weeks to reflect improvements in AROM.   MET   Patient will demonstrate at least 4-/5 strength in IR and ER MMT in neutral position with less than 4/10 pain in 4 weeks.   70% MET   Patient will demonstrate at least 4/5 strength in IR and ER MMT in neutral position with less than 4/10 pain in 8 weeks.   45% MET   Patient will demonstrate the ability to lift a 5# item from waist height to shoulder level height without pain in 8 weeks.   25% MET      Yogi Valdez, PT

## 2024-12-31 ENCOUNTER — TREATMENT (OUTPATIENT)
Dept: PHYSICAL THERAPY | Facility: CLINIC | Age: 64
End: 2024-12-31
Payer: COMMERCIAL

## 2024-12-31 DIAGNOSIS — M47.812 OSTEOARTHRITIS OF CERVICAL SPINE, UNSPECIFIED SPINAL OSTEOARTHRITIS COMPLICATION STATUS: ICD-10-CM

## 2024-12-31 DIAGNOSIS — M25.512 ACUTE PAIN OF LEFT SHOULDER: ICD-10-CM

## 2024-12-31 PROCEDURE — 97140 MANUAL THERAPY 1/> REGIONS: CPT | Mod: GP

## 2024-12-31 PROCEDURE — 97110 THERAPEUTIC EXERCISES: CPT | Mod: GP

## 2024-12-31 PROCEDURE — 97164 PT RE-EVAL EST PLAN CARE: CPT | Mod: GP

## 2024-12-31 NOTE — PROGRESS NOTES
"Physical Therapy    Physical Therapy Treatment    Patient Name: Valentine Calero \"Jaclyn\"  MRN: [id number]  Today's Date: 12/31/2024  Time Calculation  Start Time: 0803  Stop Time: 0845  Time Calculation (min): 42 min        Insurance:  Visit: #8  Authorized: 20  Plan of care: 10/9/2024 - 1/7/2025   Payor: [facility] / Plan: [facility] / Product Type: *No Product type* /     Subjective:  Patient reports to physical therapy with continued improvements in left shoulder related symptoms since the previous session. The only time she had pain in the left shoulder was on her recent trip when holding a heavy weighted item on her shoulder but other than that the pain has not been at the heightened state that it was two sessions ago.         Current pain:  Neck = 0/10  L shoulder = 2/10         Objective:  Shoulder AAROM (supine)  Pre-intervention   ER (90/90) = 70 A before pain  IR (90/90)  = 65 A with pain   Post intervention   ER (90/90) = 70 A without pain  IR (90/90)  = 65 A without pain    TTP (L)  Biceps muscle belly (short and long head)  Medial flexor tendon origin          Treatment:  Therapeutic Exercise = 2 unit  Backward arm bike = 3 minutes; level 2.1  Seated eccentric bicep curls = 12x (7#)  Prone scap retractions = 10x holding for 5 seconds each   Shoulder IR (GTB) = 2x5  Shoulder horizontal adduction with flexion (pain-free ROM) = 2x5 (GTB)  High to low row with walk outs (GTB) = 5x  Shoulder ER (GTB) = 2x5x5\"     2. Manual therapy = 1 unit  STM to bicep short head and long head:  PRT with passive elbow flex/ext = 15x  PRT into shoulder ER (90/90) = 15x  PRT into shoulder IR (90/90) = 15x        OP Education: HEP: 3 ---- Access Code: [email or web address]  - Supine Cervical Retraction with Towel  - 1 x daily - 7 x weekly - 2 sets - 10 reps - 10 seconds hold  - Mid-Lower Cervical Extension SNAG with Strap  - 1 x daily - 7 x weekly - 3 sets - 10 reps  - Standing Shoulder Horizontal Adduction with " Anchored Resistance  - 1 x daily - 7 x weekly - 2 sets - 5 reps  - Shoulder Internal Rotation with Resistance  - 1 x daily - 7 x weekly - 2 sets - 5 reps  - Shoulder External Rotation with Anchored Resistance  - 1 x daily - 7 x weekly - 1 sets - 5 reps - 5 seconds hold  - Standing High Row with Anchored Resistance  - 1 x daily - 7 x weekly - 2 sets - 12 reps        Diagnosis:  1. Acute pain of left shoulder    2. Osteoarthritis of cervical spine, unspecified spinal osteoarthritis complication status            Assessment:   Pt has immediate improvement in shoulder pain during rotational movements with STM to the biceps muscle belly and forearm flexor tendon origin. Cueing for shoulder retraction eliminates all pain with AAROM into abduction in supine, likely due to forward flexed posture and moderate thoracic kyphosis posture. Prone scapular retractions initiated today to begin periscapular activation and strengthening, followed by review of HEP.         Plan:  Continue to progress as tolerated.      Goals:  Patient will be independent with HEP.  MET  Patient will report improvement in QuickDASH outcome measure <30% disability in 4 weeks.   MET   Patient will report improvement in QuickDASH outcome measure <20% disability in 8 weeks.   MET   Patient will report improvement in NDI outcome measure <25% disability in 4 weeks.   MET   Patient will report improvement in NDI outcome measure <15% disability in 8 weeks.   MET   Patient will demonstrate at least 30 degrees of cervical flexion and extension in 4 weeks to reflect positive changes in cervical AROM.   85% MET   Patient will demonstrate at least 40 degrees of cervical flexion and extension in 8 weeks to reflect positive changes in cervical AROM.   80% MET   Patient will demonstrate at least 130 degrees of shoulder flexion AROM with less than 4/10 pain in 4 weeks to reflect improvements in AROM.   MET   Patient will demonstrate at least 4-/5 strength in IR and ER  MMT in neutral position with less than 4/10 pain in 4 weeks.   70% MET   Patient will demonstrate at least 4/5 strength in IR and ER MMT in neutral position with less than 4/10 pain in 8 weeks.   45% MET   Patient will demonstrate the ability to lift a 5# item from waist height to shoulder level height without pain in 8 weeks.   25% MET      Yogi Valdez, PT

## 2025-01-05 ASSESSMENT — PROMIS GLOBAL HEALTH SCALE
RATE_AVERAGE_PAIN: 4
CARRYOUT_SOCIAL_ACTIVITIES: VERY GOOD
RATE_MENTAL_HEALTH: FAIR
CARRYOUT_PHYSICAL_ACTIVITIES: MOSTLY
RATE_PHYSICAL_HEALTH: GOOD
RATE_QUALITY_OF_LIFE: VERY GOOD
RATE_GENERAL_HEALTH: VERY GOOD
EMOTIONAL_PROBLEMS: SOMETIMES
RATE_SOCIAL_SATISFACTION: VERY GOOD

## 2025-01-06 ENCOUNTER — TREATMENT (OUTPATIENT)
Dept: PHYSICAL THERAPY | Facility: CLINIC | Age: 65
End: 2025-01-06
Payer: COMMERCIAL

## 2025-01-06 ENCOUNTER — APPOINTMENT (OUTPATIENT)
Dept: PRIMARY CARE | Facility: CLINIC | Age: 65
End: 2025-01-06
Payer: COMMERCIAL

## 2025-01-06 VITALS
WEIGHT: 233.38 LBS | SYSTOLIC BLOOD PRESSURE: 130 MMHG | HEIGHT: 65 IN | BODY MASS INDEX: 38.88 KG/M2 | DIASTOLIC BLOOD PRESSURE: 80 MMHG | TEMPERATURE: 97.6 F | HEART RATE: 89 BPM

## 2025-01-06 DIAGNOSIS — E55.9 VITAMIN D DEFICIENCY: ICD-10-CM

## 2025-01-06 DIAGNOSIS — M25.512 ACUTE PAIN OF LEFT SHOULDER: ICD-10-CM

## 2025-01-06 DIAGNOSIS — I48.91 ATRIAL FIBRILLATION WITH RVR (MULTI): ICD-10-CM

## 2025-01-06 DIAGNOSIS — Z00.00 WELL ADULT HEALTH CHECK: Primary | ICD-10-CM

## 2025-01-06 DIAGNOSIS — Z12.31 ENCOUNTER FOR SCREENING MAMMOGRAM FOR MALIGNANT NEOPLASM OF BREAST: ICD-10-CM

## 2025-01-06 DIAGNOSIS — M47.812 OSTEOARTHRITIS OF CERVICAL SPINE, UNSPECIFIED SPINAL OSTEOARTHRITIS COMPLICATION STATUS: ICD-10-CM

## 2025-01-06 DIAGNOSIS — I10 PRIMARY HYPERTENSION: ICD-10-CM

## 2025-01-06 DIAGNOSIS — E11.8 TYPE 2 DIABETES MELLITUS WITH COMPLICATION, WITHOUT LONG-TERM CURRENT USE OF INSULIN (MULTI): ICD-10-CM

## 2025-01-06 PROBLEM — L28.1 PRURIGO NODULARIS: Status: RESOLVED | Noted: 2023-04-03 | Resolved: 2025-01-06

## 2025-01-06 LAB — POC HEMOGLOBIN A1C: 6.7 % (ref 4.2–6.5)

## 2025-01-06 PROCEDURE — 4010F ACE/ARB THERAPY RXD/TAKEN: CPT | Performed by: FAMILY MEDICINE

## 2025-01-06 PROCEDURE — 97140 MANUAL THERAPY 1/> REGIONS: CPT | Mod: GP

## 2025-01-06 PROCEDURE — 97112 NEUROMUSCULAR REEDUCATION: CPT | Mod: GP

## 2025-01-06 PROCEDURE — 90677 PCV20 VACCINE IM: CPT | Performed by: FAMILY MEDICINE

## 2025-01-06 PROCEDURE — 1036F TOBACCO NON-USER: CPT | Performed by: FAMILY MEDICINE

## 2025-01-06 PROCEDURE — 83036 HEMOGLOBIN GLYCOSYLATED A1C: CPT | Performed by: FAMILY MEDICINE

## 2025-01-06 PROCEDURE — 3008F BODY MASS INDEX DOCD: CPT | Performed by: FAMILY MEDICINE

## 2025-01-06 PROCEDURE — 99396 PREV VISIT EST AGE 40-64: CPT | Performed by: FAMILY MEDICINE

## 2025-01-06 PROCEDURE — 90471 IMMUNIZATION ADMIN: CPT | Performed by: FAMILY MEDICINE

## 2025-01-06 PROCEDURE — 3075F SYST BP GE 130 - 139MM HG: CPT | Performed by: FAMILY MEDICINE

## 2025-01-06 PROCEDURE — 3079F DIAST BP 80-89 MM HG: CPT | Performed by: FAMILY MEDICINE

## 2025-01-06 PROCEDURE — 99214 OFFICE O/P EST MOD 30 MIN: CPT | Performed by: FAMILY MEDICINE

## 2025-01-06 PROCEDURE — 93000 ELECTROCARDIOGRAM COMPLETE: CPT | Performed by: FAMILY MEDICINE

## 2025-01-06 PROCEDURE — 97110 THERAPEUTIC EXERCISES: CPT | Mod: GP

## 2025-01-06 RX ORDER — METOPROLOL SUCCINATE 100 MG/1
100 TABLET, EXTENDED RELEASE ORAL DAILY
Qty: 30 TABLET | Refills: 6 | Status: SHIPPED | OUTPATIENT
Start: 2025-01-06

## 2025-01-06 ASSESSMENT — PATIENT HEALTH QUESTIONNAIRE - PHQ9
2. FEELING DOWN, DEPRESSED OR HOPELESS: NOT AT ALL
SUM OF ALL RESPONSES TO PHQ9 QUESTIONS 1 AND 2: 0
1. LITTLE INTEREST OR PLEASURE IN DOING THINGS: NOT AT ALL

## 2025-01-06 NOTE — ASSESSMENT & PLAN NOTE
Health maintenance and immunizations up-to-date  Orders:    Follow Up In [facility] - PCP - Health Maintenance    Pneumococcal conjugate vaccine, 20-valent (PREVNAR 20)    Comprehensive Metabolic Panel; Future    Lipid Panel; Future

## 2025-01-06 NOTE — ASSESSMENT & PLAN NOTE
Will increase Toprol to 100 mg daily.  Please monitor your blood pressure and pulse and report them to me in the near future  Orders:    metoprolol succinate XL (Toprol-XL) 100 mg 24 hr tablet; Take 1 tablet (100 mg) by mouth once daily. Do not crush or chew.

## 2025-01-06 NOTE — ASSESSMENT & PLAN NOTE
Patient had irregular heartbeat on exam with tachycardia.  EKG shows atrial fibrillation with rapid ventricular response at a rate of 146.  She is entirely asymptomatic.  No chest pain no shortness of breath no palpitations no swelling.  I gave her samples of Eliquis in the office today and advised her to take 1 tablet twice a day starting immediately.  I also advised her to take a double dose of her metoprolol when she gets home drink plenty of water and lay down for a while and see if her pulse comes down.  If her pulse does not come down she was advised to go to the emergency room.  She was also advised to go to the emergency room should she develop any of the above symptoms.  Please contact the office tomorrow with an update.  Please follow-up in 3 weeks.  Will refer to cardiology for further workup  Orders:    ECG 12 Lead    apixaban (Eliquis) 5 mg tablet; Take 1 tablet (5 mg) by mouth 2 times a day.    Follow Up In [facility] - PCP - Established; Future

## 2025-01-06 NOTE — PROGRESS NOTES
"Elizabeth Calero \"Jaclyn\" is a 64 y.o. female who presents for Annual Exam.    [place]  [place] [facility] [place]  He [place] got [place].      Didn't take her meds this AM.  Roads are bad.  BP at home 130/80         Review of Systems    Objective   /80   Pulse 89   Temp 36.4 °C (97.6 °F)   Ht 1.651 m (5' 5\")   Wt 106 kg (233 lb 6 oz)   BMI 38.84 kg/m²     Physical Exam  HENT:      Head: Normocephalic and atraumatic.      Mouth/Throat:      Mouth: Mucous membranes are moist.      Pharynx: Oropharynx is clear.   Eyes:      Extraocular Movements: Extraocular movements intact.      Pupils: Pupils are equal, round, and reactive to light.   Cardiovascular:      Rate and Rhythm: Tachycardia present. Rhythm irregular.      Heart sounds: Normal heart sounds.   Pulmonary:      Effort: Pulmonary effort is normal.      Breath sounds: Normal breath sounds.   Musculoskeletal:         General: Normal range of motion.      Cervical back: Normal range of motion.   Lymphadenopathy:      Cervical: No cervical adenopathy.   Skin:     General: Skin is warm and dry.   Neurological:      General: No focal deficit present.      Mental Status: She is alert.   Psychiatric:         Mood and Affect: Mood normal.         Assessment/Plan   Assessment & Plan  Well adult health check  Health maintenance and immunizations up-to-date  Orders:    Follow Up [facility] - PCP - Health Maintenance    Pneumococcal conjugate vaccine, 20-valent (PREVNAR 20)    Comprehensive Metabolic Panel; Future    Lipid Panel; Future    Type 2 diabetes mellitus with complication, without long-term current use of insulin (Multi)  Continue same medication  Orders:    POCT glycosylated hemoglobin (Hb A1C) manually resulted    XR DEXA bone density; Future    TSH with reflex to Free T4 if abnormal; Future    Albumin-Creatinine Ratio, Urine Random; Future    Encounter for screening mammogram for malignant neoplasm of " breast    Orders:    BI mammo bilateral screening tomosynthesis; Future    Vitamin D deficiency    Orders:    Vitamin D 25-Hydroxy,Total (for eval of Vitamin D levels); Future    Atrial fibrillation with RVR (Multi)  Patient had irregular heartbeat on exam with tachycardia.  EKG shows atrial fibrillation with rapid ventricular response at a rate of 146.  She is entirely asymptomatic.  No chest pain no shortness of breath no palpitations no swelling.  I gave her samples of Eliquis in the office today and advised her to take 1 tablet twice a day starting immediately.  I also advised her to take a double dose of her metoprolol when she gets home drink plenty of water and lay down for a while and see if her pulse comes down.  If her pulse does not come down she was advised to go to the emergency room.  She was also advised to go to the emergency room should she develop any of the above symptoms.  Please contact the office tomorrow with an update.  Please follow-up in 3 weeks.  Will refer to cardiology for further workup  Orders:    ECG 12 Lead    apixaban (Eliquis) 5 mg tablet; Take 1 tablet (5 mg) by mouth 2 times a day.    Follow Up [facility] - PCP - Established; Future    Primary hypertension  Will increase Toprol to 100 mg daily.  Please monitor your blood pressure and pulse and report them to me in the near future  Orders:    metoprolol succinate XL (Toprol-XL) 100 mg 24 hr tablet; Take 1 tablet (100 mg) by mouth once daily. Do not crush or chew.           There are no Patient Instructions on file for this visit.

## 2025-01-06 NOTE — ASSESSMENT & PLAN NOTE
Continue same medication  Orders:    POCT glycosylated hemoglobin (Hb A1C) manually resulted    XR DEXA bone density; Future    TSH with reflex to Free T4 if abnormal; Future    Albumin-Creatinine Ratio, Urine Random; Future

## 2025-01-06 NOTE — PROGRESS NOTES
"Physical Therapy    Physical Therapy Treatment    Patient Name: Valentine Calero \"Jaclyn\"  MRN: [id number]  Today's Date: 1/6/2025  Time Calculation  Start Time: 1204  Stop Time: 1257  Time Calculation (min): 53 min        Insurance:  Visit: #9  Authorized: 20  Plan of care: 10/9/2024 - 1/7/2025   Payor: [facility] / Plan: [facility] / Product Type: *No Product type* /     Subjective:  Patient reports to physical therapy with continued improvements in left shoulder related symptoms since the previous session. After previous session she had no worsening of pain and relief of symptoms from continual therapy targeting shoulder and bicipital pain with passive and active movements.     She notes a recent episode at work when pulling a medicine cart that resulted in her feeling soreness and immediate discomfort to the left lateral elbow region. There was no popping or audible snapping during the moment.         Current pain:  Neck = 0/10  L shoulder = 2/10         Objective:  TTP (L)  Biceps muscle belly (short and long head)  Medial flexor tendon origin  Brachioradialis insertion distally and mm belly     No pain with passive elbow flex or ext (ROM WNL)          Treatment:  Therapeutic Exercise = 1 unit  Forward arm bike = 3 minutes; level 4.5  Eccentric bicep curls =   1x12 (7#)  1x12 with black theraband       2. Manual therapy = 1 unit  STM to brachioradilias with passive elbow flex/ext = x15  STM to biceps short and long head mm belly with passive elbow flex/ext = x15  STM to short head biceps and medial forearm flexor tendon origin with passive pronation = 10x  STM to biceps short and long head with isometric tricep extension = 5x10\" holds     3. NMR = 2 unit  Standing row to tricep extension = 2x12 (RTB)  Prone scap retractions = 5x holding for 10 seconds each   Prone scap retraction with shoulder extension = 2x5 holding for 5 seconds each   YTB SA external rotation with contra isometric " hold = 2x5         OP Education: HEP: 3 ---- Access Code: [email or web address]  - Prone Scapular Slide with Shoulder Extension  - 1 x daily - 7 x weekly - 2 sets - 5 reps - 5 seconds hold  - Standing Bilateral Elbow Extension with Anchored Resistance  - 1 x daily - 3-4 x weekly - 2 sets - 10 reps  - Eccentric Bicep Curl   - 1 x daily - 3-4 x weekly - 2 sets - 12 reps  - Shoulder External Rotation and Scapular Retraction with Resistance  - 1 x daily - 3-4 x weekly - 2 sets - 5 reps        Diagnosis:  1. Acute pain of left shoulder    2. Osteoarthritis of cervical spine, unspecified spinal osteoarthritis complication status            Assessment:   Pt continues to respond well to treatment in session, without experiencing shoulder or neck related symptoms that were previously disrupting the patient daily. As a result of recent incident at work lending to strain of biceps/brachioradialis, manual therapy in combination with eccentric movements of the bicep and antagonist activation of the triceps helped to reduce tension and improve functional capabilities. Updated HEP provided to include interventions performed in session as well as activation of RTC with minimal use of biceps. Observed to have elimination of biceps pain with external rotation when cued for scapular retraction first.         Plan:  Continue to progress as tolerated.      Goals:  Patient will be independent with HEP.  MET  Patient will report improvement in QuickDASH outcome measure <30% disability in 4 weeks.   MET   Patient will report improvement in QuickDASH outcome measure <20% disability in 8 weeks.   MET   Patient will report improvement in NDI outcome measure <25% disability in 4 weeks.   MET   Patient will report improvement in NDI outcome measure <15% disability in 8 weeks.   MET   Patient will demonstrate at least 30 degrees of cervical flexion and extension in 4 weeks to reflect positive changes in cervical AROM.   85% MET   Patient will demonstrate at  least 40 degrees of cervical flexion and extension in 8 weeks to reflect positive changes in cervical AROM.   80% MET   Patient will demonstrate at least 130 degrees of shoulder flexion AROM with less than 4/10 pain in 4 weeks to reflect improvements in AROM.   MET   Patient will demonstrate at least 4-/5 strength in IR and ER MMT in neutral position with less than 4/10 pain in 4 weeks.   70% MET   Patient will demonstrate at least 4/5 strength in IR and ER MMT in neutral position with less than 4/10 pain in 8 weeks.   45% MET   Patient will demonstrate the ability to lift a 5# item from waist height to shoulder level height without pain in 8 weeks.   35% MET      Yogi Valdez, PT

## 2025-01-13 ENCOUNTER — APPOINTMENT (OUTPATIENT)
Dept: PRIMARY CARE | Facility: CLINIC | Age: 65
End: 2025-01-13
Payer: COMMERCIAL

## 2025-01-13 ENCOUNTER — HOSPITAL ENCOUNTER (OUTPATIENT)
Dept: RADIOLOGY | Facility: HOSPITAL | Age: 65
Discharge: HOME | End: 2025-01-13
Payer: COMMERCIAL

## 2025-01-13 ENCOUNTER — APPOINTMENT (OUTPATIENT)
Dept: PHYSICAL THERAPY | Facility: CLINIC | Age: 65
End: 2025-01-13
Payer: COMMERCIAL

## 2025-01-13 DIAGNOSIS — E11.8 TYPE 2 DIABETES MELLITUS WITH COMPLICATION, WITHOUT LONG-TERM CURRENT USE OF INSULIN (MULTI): ICD-10-CM

## 2025-01-13 PROCEDURE — 77080 DXA BONE DENSITY AXIAL: CPT | Performed by: RADIOLOGY

## 2025-01-13 PROCEDURE — 77080 DXA BONE DENSITY AXIAL: CPT

## 2025-01-27 ENCOUNTER — OFFICE VISIT (OUTPATIENT)
Dept: CARDIOLOGY | Facility: CLINIC | Age: 65
End: 2025-01-27
Payer: COMMERCIAL

## 2025-01-27 VITALS
BODY MASS INDEX: 38.65 KG/M2 | HEIGHT: 65 IN | DIASTOLIC BLOOD PRESSURE: 98 MMHG | SYSTOLIC BLOOD PRESSURE: 158 MMHG | HEART RATE: 76 BPM | WEIGHT: 232 LBS

## 2025-01-27 DIAGNOSIS — I10 PRIMARY HYPERTENSION: ICD-10-CM

## 2025-01-27 DIAGNOSIS — E11.8 TYPE 2 DIABETES MELLITUS WITH COMPLICATION, WITHOUT LONG-TERM CURRENT USE OF INSULIN (MULTI): ICD-10-CM

## 2025-01-27 DIAGNOSIS — I48.0 PAF (PAROXYSMAL ATRIAL FIBRILLATION) (MULTI): ICD-10-CM

## 2025-01-27 DIAGNOSIS — E78.2 MIXED HYPERLIPIDEMIA: ICD-10-CM

## 2025-01-27 DIAGNOSIS — Z78.9 NEVER SMOKED TOBACCO: ICD-10-CM

## 2025-01-27 DIAGNOSIS — I45.10 RBBB: ICD-10-CM

## 2025-01-27 PROCEDURE — 93000 ELECTROCARDIOGRAM COMPLETE: CPT | Performed by: INTERNAL MEDICINE

## 2025-01-27 PROCEDURE — 3077F SYST BP >= 140 MM HG: CPT | Performed by: INTERNAL MEDICINE

## 2025-01-27 PROCEDURE — 1036F TOBACCO NON-USER: CPT | Performed by: INTERNAL MEDICINE

## 2025-01-27 PROCEDURE — 3008F BODY MASS INDEX DOCD: CPT | Performed by: INTERNAL MEDICINE

## 2025-01-27 PROCEDURE — 3080F DIAST BP >= 90 MM HG: CPT | Performed by: INTERNAL MEDICINE

## 2025-01-27 PROCEDURE — 99204 OFFICE O/P NEW MOD 45 MIN: CPT | Performed by: INTERNAL MEDICINE

## 2025-01-27 PROCEDURE — 4010F ACE/ARB THERAPY RXD/TAKEN: CPT | Performed by: INTERNAL MEDICINE

## 2025-01-27 RX ORDER — LANOLIN ALCOHOL/MO/W.PET/CERES
400 CREAM (GRAM) TOPICAL DAILY
Start: 2025-01-27 | End: 2026-01-27

## 2025-01-27 NOTE — PATIENT INSTRUCTIONS
Try Mag-ox 400 mg once a day after your largest meal.  If problems with diarrhea, try Magnesium glycinate.  You will be scheduled for an Echocardiogram  Will call patient with test results once reviewed by physician    Follow up office visit in 1 year.    Continue same medications/treatment.  Patient educated on proper medication use.  Patient educated on risk factor modification.  Please bring any lab results from other providers / physicians to your next appointment.    Please bring all medicines, vitamins and herbal supplements with you when you come to the office.    Prescriptions will not be filled unless you are compliant with your follow up appointments or have a follow up  appointment scheduled as per instruction of your physician.  Refills should be requested at the time of  Your visit.    Geovanna SIMMONS LPN, am scribing for and in the presence of Dr. Mp Harper MD, FACC

## 2025-01-27 NOTE — PROGRESS NOTES
CARDIOLOGY OFFICE VISIT      CHIEF COMPLAINT  Atrial fibrillation    HISTORY OF PRESENT ILLNESS  The patient is being seen today for cardiac evaluation.  She states she was seeing her family physician for routine checkup recently.  She noted her heart rate to be quite elevated around 150 bpm.  An EKG was performed which demonstrated atrial fibrillation.  She was started on Eliquis.  Her dose of metoprolol was increased.  The patient has not had any problems with these medications.  She has not had any problem with bleeding.  She does not have any past history of cardiac pathology.  She denies chest discomfort or symptoms of myocardial ischemia.  She denies dyspnea activities.  She denies palpitations and syncope.  She does not smoke cigarettes.  She has a history of hypertension and hyperlipidemia under treatment.  She has a history of diabetes mellitus type 2.  She does not have any immediate family history of any significant cardiac pathology.  She rarely drinks alcohol.  She does drink a lot of caffeine in the form of tea.  I told her she should try to cut back on that.    EKG: Normal sinus rhythm, complete right bundle branch block, nonspecific ST-T changes.      The patient does have lab work ordered.  Her past lab work consisting of CBC, chemistry profile, and TSH are satisfactory.        Impressions:  Paroxysmal Atrial Fibrillation  Hyperlipidemia  Hypertension  Diabetes mellitus type 2  Right bundle branch block  Obesity    Plan:  Agree with current medical therapy  Magnesium glycinate 400 mg a day with food  Echocardiogram    Please excuse any errors in grammar or translation related to this dictation.  Voice recognition software was utilized to prepare this document.      Past Medical History  Past Medical History:   Diagnosis Date    Abnormal ECG     Anxiety     Atrial fibrillation (Multi)     Cancer (Multi)     Diabetes mellitus (Multi)     Encounter for screening for malignant neoplasm of vagina      Vaginal Pap smear    Hypertension     Other conditions influencing health status     Normal colonoscopy    Personal history of other medical treatment     H/O mammogram    S/P hysterectomy 11/17/2011    Sleep apnea        Social History  Social History     Tobacco Use    Smoking status: Never    Smokeless tobacco: Never   Vaping Use    Vaping status: Never Used   Substance Use Topics    Alcohol use: Not Currently     Comment: Occasional User    Drug use: Never       Family History     Family History   Problem Relation Name Age of Onset    Hyperlipidemia Mother Josette     Miscarriages / Stillbirths Mother Josette     Vision loss Mother Josette     Alcohol abuse Father Rashad     Heart disease Father Rashad     Hypertension Father Rashad     Arthritis Father's Sister Marlyn     Atrial fibrillation Father's Sister Marlyn     Diabetes Maternal Grandmother Felecia Walters     Diabetes type II Maternal Grandmother Felecia Walters     Arthritis Paternal Grandmother Hanh Nelson     Stroke Paternal Grandmother Hanh Nelson         Allergies:  No Known Allergies     Outpatient Medications:  Current Outpatient Medications   Medication Instructions    apixaban (ELIQUIS) 5 mg, oral, 2 times daily    ergocalciferol (VITAMIN D-2) 1,250 mcg, oral, Once Weekly, After 3 months, reduce to once a month.    hydroCHLOROthiazide (HYDRODIURIL) 25 mg, oral, Daily    lisinopril 40 mg, oral, Daily before breakfast    loratadine (CLARITIN) 10 mg, oral, Daily    metFORMIN  mg 24 hr tablet Take 2 tabs BID.  Do not crush, chew, or split.    metoprolol succinate XL (TOPROL-XL) 100 mg, oral, Daily, Do not crush or chew.    PARoxetine (PAXIL) 20 mg, oral, Daily    rosuvastatin (CRESTOR) 5 mg, oral, Daily    semaglutide (RYBELSUS) 14 mg, oral, Daily    vit C/E/zinc/lutein/zeaxanthin (OCUVITE EYE HEALTH ORAL) 1 tablet, Daily          REVIEW OF SYSTEMS  Review of Systems   All other systems reviewed and are negative.        VITALS  Vitals:    01/27/25 1604   BP:  (!) 158/98   Pulse: 76       PHYSICAL EXAM  Vitals and nursing note reviewed.   Constitutional:       Appearance: Healthy appearance.   Eyes:      Conjunctiva/sclera: Conjunctivae normal.      Pupils: Pupils are equal, round, and reactive to light.   Pulmonary:      Effort: Pulmonary effort is normal.      Breath sounds: Normal breath sounds.   Cardiovascular:      PMI at left midclavicular line. Normal rate. Regular rhythm.      Murmurs: There is no murmur.      No gallop.  No click. No rub.   Pulses:     Intact distal pulses.   Edema:     Peripheral edema absent.   Musculoskeletal: Normal range of motion. Skin:     General: Skin is warm and dry.   Neurological:      Mental Status: Alert and oriented to person, place and time.           ASSESSMENT AND PLAN  Diagnoses and all orders for this visit:  RBBB  Primary hypertension  Mixed hyperlipidemia  PAF (paroxysmal atrial fibrillation) (Multi)  Type 2 diabetes mellitus with complication, without long-term current use of insulin (Multi)  BMI 38.0-38.9,adult  Never smoked tobacco      [unfilled]

## 2025-01-29 ENCOUNTER — APPOINTMENT (OUTPATIENT)
Dept: CARDIOLOGY | Facility: CLINIC | Age: 65
End: 2025-01-29
Payer: COMMERCIAL

## 2025-01-31 ENCOUNTER — PATIENT MESSAGE (OUTPATIENT)
Dept: CARDIOLOGY | Facility: CLINIC | Age: 65
End: 2025-01-31
Payer: COMMERCIAL

## 2025-01-31 DIAGNOSIS — I48.91 ATRIAL FIBRILLATION WITH RVR (MULTI): ICD-10-CM

## 2025-02-04 ENCOUNTER — APPOINTMENT (OUTPATIENT)
Dept: PRIMARY CARE | Facility: CLINIC | Age: 65
End: 2025-02-04
Payer: COMMERCIAL

## 2025-02-04 VITALS
WEIGHT: 227.25 LBS | BODY MASS INDEX: 37.82 KG/M2 | OXYGEN SATURATION: 98 % | HEART RATE: 78 BPM | SYSTOLIC BLOOD PRESSURE: 143 MMHG | TEMPERATURE: 97.2 F | DIASTOLIC BLOOD PRESSURE: 98 MMHG

## 2025-02-04 DIAGNOSIS — I10 PRIMARY HYPERTENSION: ICD-10-CM

## 2025-02-04 DIAGNOSIS — I48.91 ATRIAL FIBRILLATION WITH RVR (MULTI): Primary | ICD-10-CM

## 2025-02-04 PROCEDURE — 99214 OFFICE O/P EST MOD 30 MIN: CPT | Performed by: FAMILY MEDICINE

## 2025-02-04 PROCEDURE — 3080F DIAST BP >= 90 MM HG: CPT | Performed by: FAMILY MEDICINE

## 2025-02-04 PROCEDURE — 4010F ACE/ARB THERAPY RXD/TAKEN: CPT | Performed by: FAMILY MEDICINE

## 2025-02-04 PROCEDURE — 3077F SYST BP >= 140 MM HG: CPT | Performed by: FAMILY MEDICINE

## 2025-02-04 PROCEDURE — 1036F TOBACCO NON-USER: CPT | Performed by: FAMILY MEDICINE

## 2025-02-04 RX ORDER — METOPROLOL TARTRATE 100 MG/1
100 TABLET ORAL 2 TIMES DAILY
Qty: 60 TABLET | Refills: 0 | Status: SHIPPED | OUTPATIENT
Start: 2025-02-04 | End: 2025-03-06

## 2025-02-04 ASSESSMENT — ENCOUNTER SYMPTOMS
DIZZINESS: 0
SHORTNESS OF BREATH: 0
COUGH: 0
WHEEZING: 0
STRIDOR: 0
FATIGUE: 0
LIGHT-HEADEDNESS: 0
PALPITATIONS: 0

## 2025-02-04 NOTE — ASSESSMENT & PLAN NOTE
- Paroxysmal atrial fibrillation, follows with cardiology  - Normal sinus rhythm today, asymptomatic   - Will adjust metoprolol to metop tartrate 100 mg BID  - c/w Eliquis 5 mg BID   - Echo as scheduled later this month   Orders:    Follow Up In [facility] - PCP - Established    metoprolol tartrate (Lopressor) 100 mg tablet; Take 1 tablet (100 mg) by mouth 2 times a day.

## 2025-02-04 NOTE — PATIENT INSTRUCTIONS
Jaclyn,     We will switch your metoprolol succinate to metoprolol tartrate (Lopressor) at a higher dose.   This is the twice daily formulation.
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
## 2025-02-05 NOTE — PATIENT COMMUNICATION
I was able to pull ER notes from [facility] on 1/31/25, printed for review.     Patient was seen with PCP- Dr. Yeh on 2/4/25:  Metoprolol was changed to Tartrate 100mg BID (will update our med list)     Patient is scheduled for Echo on 2/17/25.     Will print mychart note from patient for Dr. Mp Callahan MD  to review today.

## 2025-02-06 NOTE — PATIENT COMMUNICATION
Per Dr. Mp Callahan MD , he reviewed [facility] ER notes I had access to.   Agreed with PCP changing Metoprolol to tartrate 100mg BID, and keep plan for Echo as scheduled 2/17/25.   Would like to refer patient to Dr. Rigo MD after that for Afib management.     Order placed and sent to provider for signature.   Will respond to patient with recommendations.

## 2025-02-07 NOTE — PROGRESS NOTES
I saw and evaluated the patient. I personally obtained the key and critical portions of the history and physical exam or was physically present for key and critical portions performed by the resident/fellow. I reviewed the resident/fellow's documentation and discussed the patient with the resident/fellow. I agree with the resident/fellow's medical decision making as documented in the note.    Naz Becerra MD

## 2025-02-17 ENCOUNTER — APPOINTMENT (OUTPATIENT)
Dept: CARDIOLOGY | Facility: CLINIC | Age: 65
End: 2025-02-17
Payer: COMMERCIAL

## 2025-02-17 LAB
25(OH)D3+25(OH)D2 SERPL-MCNC: 50 NG/ML (ref 30–100)
ALBUMIN SERPL-MCNC: 4.5 G/DL (ref 3.6–5.1)
ALP SERPL-CCNC: 51 U/L (ref 37–153)
ALT SERPL-CCNC: 21 U/L (ref 6–29)
ANION GAP SERPL CALCULATED.4IONS-SCNC: 13 MMOL/L (CALC) (ref 7–17)
AST SERPL-CCNC: 17 U/L (ref 10–35)
BILIRUB SERPL-MCNC: 0.5 MG/DL (ref 0.2–1.2)
BUN SERPL-MCNC: 16 MG/DL (ref 7–25)
CALCIUM SERPL-MCNC: 9.5 MG/DL (ref 8.6–10.4)
CHLORIDE SERPL-SCNC: 101 MMOL/L (ref 98–110)
CHOLEST SERPL-MCNC: 180 MG/DL
CHOLEST/HDLC SERPL: 4.1 (CALC)
CO2 SERPL-SCNC: 27 MMOL/L (ref 20–32)
CREAT SERPL-MCNC: 0.67 MG/DL (ref 0.5–1.05)
EGFRCR SERPLBLD CKD-EPI 2021: 99 ML/MIN/1.73M2
GLUCOSE SERPL-MCNC: 132 MG/DL (ref 65–99)
HDLC SERPL-MCNC: 44 MG/DL
LDLC SERPL CALC-MCNC: 103 MG/DL (CALC)
NONHDLC SERPL-MCNC: 136 MG/DL (CALC)
POTASSIUM SERPL-SCNC: 4.2 MMOL/L (ref 3.5–5.3)
PROT SERPL-MCNC: 6.9 G/DL (ref 6.1–8.1)
SODIUM SERPL-SCNC: 141 MMOL/L (ref 135–146)
TRIGL SERPL-MCNC: 209 MG/DL
TSH SERPL-ACNC: 3.76 MIU/L (ref 0.4–4.5)

## 2025-02-20 ENCOUNTER — HOSPITAL ENCOUNTER (OUTPATIENT)
Dept: RADIOLOGY | Facility: CLINIC | Age: 65
Discharge: HOME | End: 2025-02-20
Payer: COMMERCIAL

## 2025-02-20 ENCOUNTER — OFFICE VISIT (OUTPATIENT)
Dept: PRIMARY CARE | Facility: CLINIC | Age: 65
End: 2025-02-20
Payer: COMMERCIAL

## 2025-02-20 VITALS
DIASTOLIC BLOOD PRESSURE: 90 MMHG | TEMPERATURE: 98.2 F | RESPIRATION RATE: 16 BRPM | WEIGHT: 229 LBS | BODY MASS INDEX: 38.11 KG/M2 | OXYGEN SATURATION: 99 % | SYSTOLIC BLOOD PRESSURE: 138 MMHG

## 2025-02-20 DIAGNOSIS — M25.512 LEFT ANTERIOR SHOULDER PAIN: Primary | ICD-10-CM

## 2025-02-20 DIAGNOSIS — M77.12 EPICONDYLITIS, LATERAL, LEFT: ICD-10-CM

## 2025-02-20 DIAGNOSIS — M25.512 LEFT ANTERIOR SHOULDER PAIN: ICD-10-CM

## 2025-02-20 PROCEDURE — 73070 X-RAY EXAM OF ELBOW: CPT | Mod: LT

## 2025-02-20 PROCEDURE — 1036F TOBACCO NON-USER: CPT | Performed by: FAMILY MEDICINE

## 2025-02-20 PROCEDURE — 99214 OFFICE O/P EST MOD 30 MIN: CPT | Performed by: FAMILY MEDICINE

## 2025-02-20 PROCEDURE — 4010F ACE/ARB THERAPY RXD/TAKEN: CPT | Performed by: FAMILY MEDICINE

## 2025-02-20 PROCEDURE — 3075F SYST BP GE 130 - 139MM HG: CPT | Performed by: FAMILY MEDICINE

## 2025-02-20 PROCEDURE — 73030 X-RAY EXAM OF SHOULDER: CPT | Mod: LT

## 2025-02-20 PROCEDURE — 3080F DIAST BP >= 90 MM HG: CPT | Performed by: FAMILY MEDICINE

## 2025-02-20 RX ORDER — METHYLPREDNISOLONE 4 MG/1
TABLET ORAL
Qty: 21 TABLET | Refills: 0 | Status: SHIPPED | OUTPATIENT
Start: 2025-02-20 | End: 2025-02-26

## 2025-02-20 RX ORDER — CYCLOBENZAPRINE HCL 5 MG
5 TABLET ORAL NIGHTLY
Qty: 10 TABLET | Refills: 0 | Status: SHIPPED | OUTPATIENT
Start: 2025-02-20 | End: 2025-03-02

## 2025-02-20 ASSESSMENT — ENCOUNTER SYMPTOMS
SHORTNESS OF BREATH: 0
HEMATURIA: 0
WEAKNESS: 0
MYALGIAS: 0
DIARRHEA: 0
NECK PAIN: 0
DIFFICULTY URINATING: 0
ARTHRALGIAS: 1
BACK PAIN: 0
COUGH: 0
WHEEZING: 0
SORE THROAT: 0
NUMBNESS: 1
TINGLING: 1
RHINORRHEA: 0
NAUSEA: 0
VOMITING: 0
BLOOD IN STOOL: 0
JOINT SWELLING: 0
PALPITATIONS: 0
FEVER: 0
CONSTIPATION: 0

## 2025-02-20 ASSESSMENT — PAIN SCALES - GENERAL: PAINLEVEL_OUTOF10: 10-WORST PAIN EVER

## 2025-02-20 NOTE — ASSESSMENT & PLAN NOTE
Pt with jt pain on ROM  Pt to take medrol rosalba and cyclobenzaprine as directed  May use heat to affected areas  Ptto f/up with pcp in 7-10 d, we will alex appt for pt  Go to ER if any cp sob coughing up blood or worsening symptoms

## 2025-02-20 NOTE — PROGRESS NOTES
Subjective   Patient ID: Jaclyn Calero is a 64 y.o. female who presents for Arm Pain.    Arm Pain   Incident onset: Monday. The pain is present in the left forearm. Quality: sharp. The pain radiates to the left arm. The pain is at a severity of 10/10. Associated symptoms include numbness and tingling. Pertinent negatives include no chest pain. Exacerbated by: Touching affected area.        Review of Systems   Constitutional:  Negative for fever.   HENT:  Negative for congestion, ear pain, rhinorrhea and sore throat.    Respiratory:  Negative for cough, shortness of breath and wheezing.    Cardiovascular:  Negative for chest pain and palpitations.        Pt saw card/oshaunessey 2 wk ago, no new problems   Gastrointestinal:  Negative for blood in stool, constipation, diarrhea, nausea and vomiting.   Genitourinary:  Negative for difficulty urinating and hematuria.   Musculoskeletal:  Positive for arthralgias. Negative for back pain, joint swelling, myalgias and neck pain.        Left elbow pain, noted a small lump with mild swelling  No bruising, has some occ numbness in area  Pt  doesn't recall any trauma or strenuous activity.   Pt took tylenol, naprosyn, had some relief  Pt on eliquis for a fib, no bruising or bleeding   Skin:  Negative for rash.   Neurological:  Positive for tingling and numbness. Negative for weakness.       Objective   /90   Temp 36.8 °C (98.2 °F)   Resp 16   Wt 104 kg (229 lb)   SpO2 99%   BMI 38.11 kg/m²     Physical Exam  Vitals and nursing note reviewed.   Constitutional:       General: She is not in acute distress.     Appearance: Normal appearance.   HENT:      Head: Normocephalic and atraumatic.   Cardiovascular:      Rate and Rhythm: Normal rate and regular rhythm.      Heart sounds: Normal heart sounds.   Pulmonary:      Effort: Pulmonary effort is normal.      Breath sounds: Normal breath sounds.   Musculoskeletal:         General: Tenderness present. No swelling or deformity.       Comments: Left arm symmetrical to right  No edema erythema or ecchymosis  FROM with pain on abduction of shoulder and flexion  Pain with flex.ext and rotation of elbow  Neurovasc intact   Skin:     General: Skin is warm and dry.   Neurological:      General: No focal deficit present.      Mental Status: She is alert.      Sensory: No sensory deficit.      Motor: No weakness.         Assessment/Plan   Problem List Items Addressed This Visit             ICD-10-CM    Epicondylitis, lateral, left M77.12    Relevant Medications    methylPREDNISolone (Medrol Dospak) 4 mg tablets    cyclobenzaprine (Flexeril) 5 mg tablet    Other Relevant Orders    XR elbow left 1-2 views    Left anterior shoulder pain - Primary M25.512     Pt with jt pain on ROM  Pt to take medrol rosalba and cyclobenzaprine as directed  May use heat to affected areas  Ptto f/up with pcp in 7-10 d, we will alex appt for pt  Go to ER if any cp sob coughing up blood or worsening symptoms         Relevant Medications    methylPREDNISolone (Medrol Dospak) 4 mg tablets    cyclobenzaprine (Flexeril) 5 mg tablet    Other Relevant Orders    XR shoulder left 2+ views

## 2025-02-24 ENCOUNTER — OFFICE VISIT (OUTPATIENT)
Dept: ORTHOPEDIC SURGERY | Facility: CLINIC | Age: 65
End: 2025-02-24
Payer: COMMERCIAL

## 2025-02-24 DIAGNOSIS — S46.812A TRAPEZIUS STRAIN, LEFT, INITIAL ENCOUNTER: ICD-10-CM

## 2025-02-24 DIAGNOSIS — M75.82 ROTATOR CUFF TENDONITIS, LEFT: ICD-10-CM

## 2025-02-24 DIAGNOSIS — M54.12 CERVICAL RADICULOPATHY: ICD-10-CM

## 2025-02-24 PROCEDURE — 99214 OFFICE O/P EST MOD 30 MIN: CPT | Performed by: STUDENT IN AN ORGANIZED HEALTH CARE EDUCATION/TRAINING PROGRAM

## 2025-02-24 PROCEDURE — 4010F ACE/ARB THERAPY RXD/TAKEN: CPT | Performed by: STUDENT IN AN ORGANIZED HEALTH CARE EDUCATION/TRAINING PROGRAM

## 2025-02-24 PROCEDURE — 99203 OFFICE O/P NEW LOW 30 MIN: CPT | Performed by: STUDENT IN AN ORGANIZED HEALTH CARE EDUCATION/TRAINING PROGRAM

## 2025-02-24 NOTE — PROGRESS NOTES
"  Acute Injury New Patient Visit    HPI: Valentine \"Augusto" is a 64 y.o.female who presents today with new complaints of left shoulder pain.  She is right-hand dominant.  For about 6 months she has been having issues with her left shoulder.  The pain is anterior.  She states that certain movements make it worse.  She is also having some numbness and tingling all the way down to her hand.  She had a shot of Toradol at her PCP which seemed to help.  She also started physical therapy for rotator cuff tendinitis.  She was doing better.  She denies any interval falls or injuries.  The Medrol Dosepak helped initially, but she was placed on 1 on 2/20/2025, which is not helping it.  She is also on Flexeril which helps.    Plan: For this left rotator cuff tendinitis, left trapezius strain, and left cervical radiculopathy, we will order PT to encompass these diagnoses, continue with her steroids, provide her with a sling for a couple of days to settle down the inflammation, but would like her out of it after a few days so that she does not lose her range of motion.  Additionally, discussed conservative treatment measures including rest, ice, elevation, compression, and over-the-counter analgesia as needed and as appropriate.  Risks of NSAID use, steroid use, and muscle relaxers discussed in depth and considered in light of medical comorbidities.  Patient, and parent/guardian as applicable, understand agree with plan.  Follow-up: 3 to 4 weeks  X-rays on follow-up: Consider MRI if not better, also consider corticosteroid injection      Assessment:   Problem List Items Addressed This Visit    None  Visit Diagnoses       Rotator cuff tendonitis, left        Relevant Orders    Referral to Physical Therapy    Sling    Trapezius strain, left, initial encounter        Relevant Orders    Referral to Physical Therapy    Sling    Cervical radiculopathy        Relevant Orders    Referral to Physical Therapy    Sling      "       Diagnostics: Reviewed all relevant imaging including x-ray, MRI, CT, and US.      Procedure:  Procedures    Physical Exam:  GENERAL:  No obvious acute distress.  NEURO:  Distally neurovascularly intact.  Sensation intact to light touch.  Extremity: Left shoulder exam shows:  Skin is intact;  No erythema or warmth;  No edema or ecchymosis;  No clinical signs of infection;  Can forward flex to 165 degrees;  Abduction to 165 degrees;  External rotation from neutral at 75 degrees;  Internal rotation at the level of T10;  POSITIVE signs of impingement with Jansen or Neer's test;  Negative empty can test;  Negative crossarm test;  No pain over the AC joint;  Negative [place]'s test;  Negative sulcus sign;  Negative anterior apprehension's test; and  Neurovascularly intact.    Exam of the cervical spine:  No tenderness along the midline of the cervical spine;  No step-offs along the midline of the cervical spine;  Flexion is full with no pain;  Extension is full with no pain;  Sidebending is full and symmetric with no pain;  Rotation is full and symmetric with no pain;  Negative Spurling's on the right;  Negative Spurling's on the left;  Full strength and range of motion throughout the bilateral upper extremities; and  Nonantalgic gait.      Orders Placed This Encounter    Sling    Referral to Physical Therapy      At the conclusion of the visit there were no further questions by the patient/family regarding their plan of care.  Patient was instructed to call or return with any issues, questions, or concerns regarding their injury and/or treatment plan described above.     02/24/25 at 5:08 PM - Husam Feng,     Office: [phone]    This note was prepared using voice recognition software.  The details of this note are correct and have been reviewed, and corrected to the best of my ability.  Some grammatical errors may persist related to the Dragon software.

## 2025-02-24 NOTE — LETTER
February 24, 2025     Patient: Valentine Calero   YOB: 1960   Date of Visit: 2/24/2025       To Whom It May Concern:    It is my medical opinion that Valentine Calero may return to work on 02/25/25 with the following restrictions, no lifting, pushing, or pulling anything more than 10 lbs for the following 4 weeks .    If you have any questions or concerns, please don't hesitate to call.               Husam Feng, DO

## 2025-02-28 ENCOUNTER — OFFICE VISIT (OUTPATIENT)
Dept: ORTHOPEDIC SURGERY | Facility: CLINIC | Age: 65
End: 2025-02-28
Payer: COMMERCIAL

## 2025-02-28 DIAGNOSIS — S46.812A TRAPEZIUS STRAIN, LEFT, INITIAL ENCOUNTER: ICD-10-CM

## 2025-02-28 DIAGNOSIS — M54.12 CERVICAL RADICULOPATHY: Primary | ICD-10-CM

## 2025-02-28 DIAGNOSIS — M75.82 ROTATOR CUFF TENDONITIS, LEFT: ICD-10-CM

## 2025-02-28 RX ORDER — BETAMETHASONE SODIUM PHOSPHATE AND BETAMETHASONE ACETATE 3; 3 MG/ML; MG/ML
12 INJECTION, SUSPENSION INTRA-ARTICULAR; INTRALESIONAL; INTRAMUSCULAR; SOFT TISSUE
Status: COMPLETED | OUTPATIENT
Start: 2025-02-28 | End: 2025-02-28

## 2025-02-28 RX ORDER — LIDOCAINE HYDROCHLORIDE 10 MG/ML
6 INJECTION, SOLUTION INFILTRATION; PERINEURAL
Status: COMPLETED | OUTPATIENT
Start: 2025-02-28 | End: 2025-02-28

## 2025-02-28 RX ADMIN — BETAMETHASONE SODIUM PHOSPHATE AND BETAMETHASONE ACETATE 12 MG: 3; 3 INJECTION, SUSPENSION INTRA-ARTICULAR; INTRALESIONAL; INTRAMUSCULAR; SOFT TISSUE at 12:12

## 2025-02-28 RX ADMIN — LIDOCAINE HYDROCHLORIDE 6 ML: 10 INJECTION, SOLUTION INFILTRATION; PERINEURAL at 12:12

## 2025-02-28 NOTE — PROGRESS NOTES
"  Acute Injury New Patient Visit    HPI: Valentine \"Augusto" is a 64 y.o.female who presents today for follow-up of her left rotator cuff tendinitis, left trapezius strain, left cervical radiculopathy.  She states that she is not getting any relief with her steroids and cannot take NSAIDs.  She would like to try a corticosteroid injection today.  Denies any interval falls or injuries.  Denies any new symptoms.  Still having pain primarily into the shoulder, worse when she is trying to sleep.    2/24/2025, she presented with new complaints of left shoulder pain.  She is right-hand dominant.  For about 6 months she has been having issues with her left shoulder.  The pain is anterior.  She states that certain movements make it worse.  She is also having some numbness and tingling all the way down to her hand.  She had a shot of Toradol at her PCP which seemed to help.  She also started physical therapy for rotator cuff tendinitis.  She was doing better.  She denies any interval falls or injuries.  The Medrol Dosepak helped initially, but she was placed on 1 on 2/20/2025, which is not helping it.  She is also on Flexeril which helps.    Plan: Today, on 2/20/2025, proceeded with a corticosteroid injection of the subacromial space which she tolerated well without complications.  Postinjection instructions given.    On 2/24/2025, for this left rotator cuff tendinitis, left trapezius strain, and left cervical radiculopathy, we will order PT to encompass these diagnoses, continue with her steroids, provide her with a sling for a couple of days to settle down the inflammation, but would like her out of it after a few days so that she does not lose her range of motion.  Additionally, discussed conservative treatment measures including rest, ice, elevation, compression, and over-the-counter analgesia as needed and as appropriate.  Risks of NSAID use, steroid use, and muscle relaxers discussed in depth and considered in light of " medical comorbidities.  Patient, and parent/guardian as applicable, understand agree with plan.  Follow-up: 3 to 4 weeks  X-rays on follow-up: Consider MRI if not better      Assessment:   Problem List Items Addressed This Visit    None  Visit Diagnoses       Cervical radiculopathy    -  Primary    Rotator cuff tendonitis, left        Trapezius strain, left, initial encounter                  Diagnostics: Reviewed all relevant imaging including x-ray, MRI, CT, and US.      Procedure:  L Inj/Asp: L subacromial bursa on 2/28/2025 12:12 PM  Indications: pain  Details: 22 G needle, posterior approach  Medications: 6 mL lidocaine 10 mg/mL (1 %); 12 mg betamethasone acet,sod phos 6 mg/mL  Outcome: tolerated well, no immediate complications  Procedure, treatment alternatives, risks and benefits explained, specific risks discussed. Consent was given by the patient. Immediately prior to procedure a time out was called to verify the correct patient, procedure, equipment, support staff and site/side marked as required. Patient was prepped and draped in the usual sterile fashion.           Physical Exam:  GENERAL:  No obvious acute distress.  NEURO:  Distally neurovascularly intact.  Sensation intact to light touch.  Extremity: Left shoulder exam shows:  Skin is intact;  No erythema or warmth;  No edema or ecchymosis;  No clinical signs of infection;  Can forward flex to 150 degrees;  Abduction to 150 degrees;  External rotation from neutral at 75 degrees;  Internal rotation at the level of T10;  POSITIVE signs of impingement with Jansen or Neer's test;  Negative empty can test;  Negative crossarm test;  No pain over the AC joint;  Negative Conway's test;  Negative sulcus sign;  Negative anterior apprehension's test; and  Neurovascularly intact.    Exam of the cervical spine:  No tenderness along the midline of the cervical spine;  No step-offs along the midline of the cervical spine;  Flexion is full with no pain;  Extension  is full with no pain;  Sidebending is full and symmetric with no pain;  Rotation is full and symmetric with no pain;  Negative Spurling's on the right;  Negative Spurling's on the left;  Full strength and range of motion throughout the bilateral upper extremities; and  Nonantalgic gait.      Orders Placed This Encounter    L Inj/Asp      At the conclusion of the visit there were no further questions by the patient/family regarding their plan of care.  Patient was instructed to call or return with any issues, questions, or concerns regarding their injury and/or treatment plan described above.     02/28/25 at 12:12 PM - Husam Feng,     Office: [phone]    This note was prepared using voice recognition software.  The details of this note are correct and have been reviewed, and corrected to the best of my ability.  Some grammatical errors may persist related to the Dragon software.

## 2025-03-03 DIAGNOSIS — I48.91 ATRIAL FIBRILLATION WITH RVR (MULTI): ICD-10-CM

## 2025-03-03 RX ORDER — METOPROLOL TARTRATE 100 MG/1
100 TABLET ORAL 2 TIMES DAILY
Qty: 60 TABLET | Refills: 3 | Status: SHIPPED | OUTPATIENT
Start: 2025-03-03

## 2025-03-03 NOTE — TELEPHONE ENCOUNTER
"[facility] [place]    Valentine Calero \"Jaclyn\"  [place] [address] Clinical Support Staff  Phone Number: [phone]     HI I need a refill on Metoprolol tartate 100mg po bid called into [facility] thank  you . Have a good day.            "

## 2025-03-07 ENCOUNTER — EVALUATION (OUTPATIENT)
Dept: PHYSICAL THERAPY | Facility: CLINIC | Age: 65
End: 2025-03-07
Payer: COMMERCIAL

## 2025-03-07 DIAGNOSIS — M54.12 CERVICAL RADICULOPATHY: ICD-10-CM

## 2025-03-07 DIAGNOSIS — S46.812A TRAPEZIUS STRAIN, LEFT, INITIAL ENCOUNTER: ICD-10-CM

## 2025-03-07 DIAGNOSIS — M75.82 ROTATOR CUFF TENDONITIS, LEFT: ICD-10-CM

## 2025-03-07 PROCEDURE — 97140 MANUAL THERAPY 1/> REGIONS: CPT | Mod: GP

## 2025-03-07 PROCEDURE — 97161 PT EVAL LOW COMPLEX 20 MIN: CPT | Mod: GP

## 2025-03-07 PROCEDURE — 97110 THERAPEUTIC EXERCISES: CPT | Mod: GP

## 2025-03-07 NOTE — PROGRESS NOTES
"Patient Name: Valentine Calero \"Jaclyn\"  MRN: [id number]  Today's Date: 3/7/2025  Visit #1  Time Calculation  Start Time: 1045  Stop Time: 1130  Time Calculation (min): 45 min  2025 // 0% coins, no ded, $7350 / $33380 oop ($2433.59 / $2433.59 met), $30 copay, 20v donna yr (1v used as of 3/3/2025), no PA  [employer] Choice Plus  Limited to 4 units per visit  20560 - 20561 not covered.    Referring Provider: Husam Feng DO    Subjective:  Chief Complaint: Pt complains of acute on chronic L shoulder pain with new onset of neck pain that causes numbness and tingling into the forearm and hand. Pt is using a sling at work to prevent overuse.   Onset Date: 2024  Pain intensity at Best: 2/10  Pain intensity at Worst: 3/10  24 hour behavior: No time of day is worse than others  Aggravating factors: Any movement  Easing factors: Heat/ice, recently received cortisone shot and felt instant relief  Current Activity Status: Working full time but has had to make modifications to job duties  PMH Prevalent to episode: Recently seen PT for L shoulder pain  Occupation: Nurse  Sleep Status: Sleeping well through night, on muscle relaxant   Therapy Goals: Become pain free    Objective:  Measurement and OM  VBI Screen:  Patient denies diplopia, dizziness, drop attacks, dysarthria, dysphagia, nystagmus, nausea and numbness at this time.    Observation and Posture:   Forward head posture with rounded shoulders    Functional Assessment:  Hand Behind Neck: to ear  Hand Behind Back: L5 with increased time    Palpation and Joint Mobility:  Empty end feel with all joint mobility  Tender to palpation along L upper trapezius with mild fibrosis noted; tenderness along medial border of scapula     Cervical AROM  Flexion: 40°  Extension: 70°  Rotation L/R: 80°/80°  Side Bending L/R: 25 **pain°/35°    Shoulder AROM L  Flexion: 130°  Abduction: 110°  External Rotation: 80°  Internal rotation: 30°  *Passive full    Shoulder MMT L  Upper " trapezius: 4/5 *Pain  Middle trapezius: 3+/5 *Pain  Lower trapezius: 3+/5 *Pain  Serratus anterior: 4/5    Elbow Clearing: WFL    UQ Neuro Screen  Cervical Myotomes (L/R)  C4 Scapular elevation: 5/5, 5/5  C5 Shoulder abduction: 4-/5, 5/5  C6 Elbow flexion: 4-/5, 5/5  C7 Elbow extension: 4/5, 5/5  C8 Thumb extension: 5/5,5/5    Dermatomes intact BUE    Scapular position: Medial border winging     Shoulder Special Tests (L/R)    Painful Arc: Positive  Drop Arm Test: Positive  Shrug Sign: Negative  ULTT4: Positive    QuickDASH Score: 40  NDI Score: 16      Treatment:  PT Evaluation Time Entry  PT Evaluation (Low) Time Entry: 20  PT Therapeutic Procedures Time Entry  Manual Therapy Time Entry: 10  Therapeutic Exercise Time Entry: 15  HEP Initiated and Reviewed  - Seated Scapular Retraction  - 3 x daily - 7 x weekly - 3 sets - 10 reps - 5s hold  - Seated Cervical Retraction  - 3 x daily - 7 x weekly - 3 sets - 10 reps - 2s hold  - Seated Upper Trapezius Stretch  - 3 x daily - 7 x weekly - 3 sets - 10 reps - 20s hold  - Supine Shoulder Flexion with Dowel  - 3 x daily - 7 x weekly - 3 sets - 10 reps  - Isometric Shoulder Flexion at Wall  - 3 x daily - 7 x weekly - 3 sets - 10 reps - 10s hold  Education regarding prevalent anatomy, activity modifications, and prognosis     Assessment:   Pt is a 64 year old female who presents with acute on chronic L shoulder pain that radiates numbness and tingling to the forearm and hand. Pt demonstrates signs and symptoms consistent with rotator cuff tendinitis as well a strain of the upper trapezius and cervical radiculopathy. Tests and measures indicate decreased shoulder active range of motion in all planes, along with decreased strength of periscapular muscles and deep cervical flexors. Pt would benefit from skilled PT intervention to address deficits and return to prior functional levels. Pt treatment includes: manual techniques to decrease pain and improve joint/soft-tissue  mobility, as well as exercises to increase strength, endurance, and neuromotor control.      This patient's clinical presentation demonstrates minimal factors influencing daily activities, indicating a low complexity. There are no significant comorbidities that complicate the desired treatment plan.  PT services are warranted in order to account measurable change in the above outcome measures and achieve improvements in the patient's functional status as well as prevention of future episodes.     Plan:   Planned interventions include: education/instruction, manual therapy, neuromuscular re-education, self care/home management, therapeutic activities and therapeutic exercises.   Potential to achieve rehab goals: Good    POC: 1-2x per week/ 6 weeks    Goals:   Pt will demonstrate compliance and independence to home exercise program.   Pt will increase shoulder flexion AROM to >160 degrees to reach overhead  Pt will increase shoulder strength to >4+/5 to participate in work duties.   Pt will decrease NDI score > 5 points to meet the MCID  Pt will decrease score of Quick DASH by > 8 points to meet the MCID

## 2025-03-09 DIAGNOSIS — E55.9 VITAMIN D DEFICIENCY: ICD-10-CM

## 2025-03-10 ENCOUNTER — TREATMENT (OUTPATIENT)
Dept: PHYSICAL THERAPY | Facility: CLINIC | Age: 65
End: 2025-03-10
Payer: COMMERCIAL

## 2025-03-10 ENCOUNTER — TELEPHONE (OUTPATIENT)
Dept: CARDIOLOGY | Facility: CLINIC | Age: 65
End: 2025-03-10

## 2025-03-10 ENCOUNTER — HOSPITAL ENCOUNTER (OUTPATIENT)
Dept: CARDIOLOGY | Facility: HOSPITAL | Age: 65
Discharge: HOME | End: 2025-03-10
Payer: COMMERCIAL

## 2025-03-10 DIAGNOSIS — I10 PRIMARY HYPERTENSION: ICD-10-CM

## 2025-03-10 DIAGNOSIS — S46.812A TRAPEZIUS STRAIN, LEFT, INITIAL ENCOUNTER: ICD-10-CM

## 2025-03-10 DIAGNOSIS — M54.12 CERVICAL RADICULOPATHY: ICD-10-CM

## 2025-03-10 DIAGNOSIS — M75.82 ROTATOR CUFF TENDONITIS, LEFT: ICD-10-CM

## 2025-03-10 DIAGNOSIS — I45.10 RBBB: ICD-10-CM

## 2025-03-10 LAB
AORTIC VALVE MEAN GRADIENT: 4 MMHG
AORTIC VALVE PEAK VELOCITY: 1.41 M/S
AV PEAK GRADIENT: 8 MMHG
AVA (PEAK VEL): 2.36 CM2
AVA (VTI): 2.68 CM2
EJECTION FRACTION APICAL 4 CHAMBER: 59.5
EJECTION FRACTION: 60 %
LEFT ATRIUM VOLUME AREA LENGTH INDEX BSA: 17 ML/M2
LEFT VENTRICLE INTERNAL DIMENSION DIASTOLE: 4.6 CM (ref 3.5–6)
LEFT VENTRICULAR OUTFLOW TRACT DIAMETER: 2 CM
LV EJECTION FRACTION BIPLANE: 60 %
MITRAL VALVE E/A RATIO: 0.84
RIGHT VENTRICLE FREE WALL PEAK S': 11.1 CM/S
RIGHT VENTRICLE PEAK SYSTOLIC PRESSURE: 19.5 MMHG
TRICUSPID ANNULAR PLANE SYSTOLIC EXCURSION: 2 CM

## 2025-03-10 PROCEDURE — 97110 THERAPEUTIC EXERCISES: CPT | Mod: GP

## 2025-03-10 PROCEDURE — 97140 MANUAL THERAPY 1/> REGIONS: CPT | Mod: GP

## 2025-03-10 PROCEDURE — 93306 TTE W/DOPPLER COMPLETE: CPT | Performed by: INTERNAL MEDICINE

## 2025-03-10 PROCEDURE — 93306 TTE W/DOPPLER COMPLETE: CPT

## 2025-03-10 RX ORDER — ERGOCALCIFEROL 1.25 MG/1
1 CAPSULE ORAL
Qty: 12 CAPSULE | Refills: 0 | Status: SHIPPED | OUTPATIENT
Start: 2025-03-16

## 2025-03-10 NOTE — PROGRESS NOTES
"Physical Therapy Treatment      Patient Name: Valentine Calero \"Jaclyn\"  MRN: [id number]  Today's Date: 3/10/2025  Visit #2  Time Calculation  Start Time: 1430  Stop Time: 1500  Time Calculation (min): 30 min    Insurance:  2025 // 0% coins, no ded, $7350 / $01946 oop ($2433.59 / $2433.59 met), $30 copay, 20v donna yr (1v used as of 3/3/2025), no PA    [facility] Choice Plus  Limited to 4 units per visit  20560 - 20561 not covered.    Assessment:  Jaclyn has made significant progress since her first visit. She is still noticing paresthesias in her L arm and hand but pain has decreased greatly. Pt still demonstrates decreased strength of the LUE as well as difficulty completing ADLs. Today's visit was only 30 minutes due to a schedule conflict- able to maximize time with efficiency.     Patient's response to session: Decreased pain and Increased ROM/joint mobility    Pt will continue to benefit from skilled PT interventions to address deficits and return to prior functional levels. Treatment includes: manual techniques to decrease pain and improve joint/soft-tissue mobility, as well as exercises to increase strength, endurance, and neuromotor control. Home exercise program has been updated to augment this session.    Plan:  Continue per POC. Add wall walk next visit.     Current Problem:   1. Rotator cuff tendonitis, left  Follow Up In Physical Therapy      2. Trapezius strain, left, initial encounter  Follow Up In Physical Therapy      3. Cervical radiculopathy  Follow Up In Physical Therapy          Subjective   Jaclyn says she has had a significant decrease in pain since her first visit. She states that she is still wearing her sling at work to avoid accidental pulling but has not had issues other than the occasional numbness/tingling.      Pain upon arrival: 0/10     Pain upon departure: increase in paresthesias, denies pain    Objective   Increased paresthesias of LUE with wand flexion repetition " #3    Treatments:  PT Therapeutic Procedures Time Entry  Manual Therapy Time Entry: 18  Therapeutic Exercise Time Entry: 12    Therapeutic Exercise (32190):  HEP review  UBE 2/2  Pulleys   Chin tuck with ball   Wand chest press    Manual Therapy (78596):  Gr 1-2 PA cervical mobilizations  Suboccipital STM  Upper trapezius STM   Passive ulnar nerve glide    Education and discussion on HEP and treatment regarding the benefits related to current condition, POC, pathophysiology, and precautions

## 2025-03-10 NOTE — TELEPHONE ENCOUNTER
----- Message from Nurse Geovanna MYERS sent at 3/10/2025 11:54 AM EDT -----    ----- Message -----  From: Mp Harper MD  Sent: 3/10/2025  11:41 AM EDT  To: Geovanna Cao LPN    Echo demonstrates normal left ventricular systolic function, no significant valvular  ----- Message -----  From: Interface, Syngo - Cardiology Results In  Sent: 3/10/2025  10:08 AM EDT  To: Mp Harper MD

## 2025-03-12 ENCOUNTER — TREATMENT (OUTPATIENT)
Dept: PHYSICAL THERAPY | Facility: CLINIC | Age: 65
End: 2025-03-12
Payer: COMMERCIAL

## 2025-03-12 DIAGNOSIS — M54.12 CERVICAL RADICULOPATHY: ICD-10-CM

## 2025-03-12 DIAGNOSIS — M75.82 ROTATOR CUFF TENDONITIS, LEFT: ICD-10-CM

## 2025-03-12 DIAGNOSIS — S46.812A TRAPEZIUS STRAIN, LEFT, INITIAL ENCOUNTER: ICD-10-CM

## 2025-03-12 PROCEDURE — 97140 MANUAL THERAPY 1/> REGIONS: CPT | Mod: GP

## 2025-03-12 PROCEDURE — 97110 THERAPEUTIC EXERCISES: CPT | Mod: GP

## 2025-03-12 NOTE — PROGRESS NOTES
"Physical Therapy Treatment      Patient Name: Valentine Calero \"Jaclyn\"  MRN: [id number]  Today's Date: 3/12/2025  Visit #3  Time Calculation  Start Time: 1615  Stop Time: 1700  Time Calculation (min): 45 min    Insurance:  2025 // 0% coins, no ded, $7350 / $21779 oop ($2433.59 / $2433.59 met), $30 copay, 20v donna yr (1v used as of 3/3/2025), no PA  [employer] Choice Plus  Limited to 4 units per visit  20560 - 20561 not covered.    Assessment:  Jaclyn was able to tolerate few exercises today. She is still having paresthesias to the L forearm and hand into the 4th and 5th digits. Initiated ulnar nerve  without symptom aggravation. She demonstrates increased strength and active range of motion.  to the cervical spine and manual traction do not relieve symptoms.     Patient's response to session: No change in pain    Pt will continue to benefit from skilled PT interventions to address deficits and return to prior functional levels. Treatment includes: manual techniques to decrease pain and improve joint/soft-tissue mobility, as well as exercises to increase strength, endurance, and neuromotor control. Home exercise program has been updated to augment this session.    Plan:  Continue per POC. Add wall walk next visit.     Current Problem:   1. Rotator cuff tendonitis, left  Follow Up In Physical Therapy      2. Trapezius strain, left, initial encounter  Follow Up In Physical Therapy      3. Cervical radiculopathy  Follow Up In Physical Therapy          Subjective   Jaclyn states things are feeling okay since last session. She says that she is still feeling pins and needles into her forearm and hand.      Pain upon arrival: 1/10     Pain upon departure: Paresthesias, denies pain    Objective   Increased paresthesias with cervical  and manual traction  Active shoulder abduction  degrees    Treatments:  PT Therapeutic Procedures Time Entry  Manual Therapy Time Entry: 20  Therapeutic Exercise Time " Entry: 25    Therapeutic Exercise (69290):  HEP Initiated and Reviewed  - Seated Scapular Retraction  - 3 x daily - 7 x weekly - 3 sets - 10 reps - 5s hold  - Seated Cervical Retraction  - 3 x daily - 7 x weekly - 3 sets - 10 reps - 2s hold  - Seated Upper Trapezius Stretch  - 3 x daily - 7 x weekly - 3 sets - 10 reps - 20s hold  - Supine Shoulder Flexion with Dowel  - 3 x daily - 7 x weekly - 3 sets - 10 reps  - Isometric Shoulder Flexion at Wall  - 3 x daily - 7 x weekly - 3 sets - 10 reps - 10s hold  Education regarding prevalent anatomy, activity modifications, and prognosis   Ulnar nerve glide   Standing pulleys  3/3 UBE  Resisted scapular retraction RTB    Manual Therapy (00621):  Gr 2-3 CPA glides to cervical and thoracic spine  Manual cervical traction  Suboccipital STM   Upper trapezius STM    Education and discussion on HEP and treatment regarding the benefits related to current condition, POC, pathophysiology, and precautions

## 2025-03-17 ENCOUNTER — APPOINTMENT (OUTPATIENT)
Dept: ORTHOPEDIC SURGERY | Facility: CLINIC | Age: 65
End: 2025-03-17
Payer: COMMERCIAL

## 2025-03-17 ENCOUNTER — APPOINTMENT (OUTPATIENT)
Dept: CARDIOLOGY | Facility: CLINIC | Age: 65
End: 2025-03-17
Payer: COMMERCIAL

## 2025-03-17 DIAGNOSIS — M75.82 ROTATOR CUFF TENDONITIS, LEFT: ICD-10-CM

## 2025-03-17 DIAGNOSIS — S46.812A TRAPEZIUS STRAIN, LEFT, INITIAL ENCOUNTER: Primary | ICD-10-CM

## 2025-03-17 DIAGNOSIS — M54.12 CERVICAL RADICULOPATHY: ICD-10-CM

## 2025-03-17 PROCEDURE — 4010F ACE/ARB THERAPY RXD/TAKEN: CPT | Performed by: STUDENT IN AN ORGANIZED HEALTH CARE EDUCATION/TRAINING PROGRAM

## 2025-03-17 PROCEDURE — 99213 OFFICE O/P EST LOW 20 MIN: CPT | Performed by: STUDENT IN AN ORGANIZED HEALTH CARE EDUCATION/TRAINING PROGRAM

## 2025-03-17 NOTE — PROGRESS NOTES
"  Acute Injury New Patient Visit    HPI: Valentine \"Augusto" is a 64 y.o.female who presents today for follow-up of her left rotator cuff tendinitis, left trapezius strain, left cervical radiculopathy.  She states that she is about 90% better after her subacromial bursa injection on 2/28/2025.  States that physical therapy is going well.  However, she is still having some pins and needle feeling in her hand on the left, she states all fingers.  She denies any interval falls or injuries.    On 2/28/2025, she presented for follow-up of her left rotator cuff tendinitis, left trapezius strain, left cervical radiculopathy.  She states that she is not getting any relief with her steroids and cannot take NSAIDs.  She would like to try a corticosteroid injection today.  Denies any interval falls or injuries.  Denies any new symptoms.  Still having pain primarily into the shoulder, worse when she is trying to sleep.    2/24/2025, she presented with new complaints of left shoulder pain.  She is right-hand dominant.  For about 6 months she has been having issues with her left shoulder.  The pain is anterior.  She states that certain movements make it worse.  She is also having some numbness and tingling all the way down to her hand.  She had a shot of Toradol at her PCP which seemed to help.  She also started physical therapy for rotator cuff tendinitis.  She was doing better.  She denies any interval falls or injuries.  The Medrol Dosepak helped initially, but she was placed on 1 on 2/20/2025, which is not helping it.  She is also on Flexeril which helps.    Plan: Today, on 3/17/2025, we will continue physical therapy and home exercises, and will consider an MRI of the cervical spine if she is not better and still having some paresthesias.    On 2/28/2025, proceeded with a corticosteroid injection of the subacromial space which she tolerated well without complications.  Postinjection instructions given.    On 2/24/2025, for " this left rotator cuff tendinitis, left trapezius strain, and left cervical radiculopathy, we will order PT to encompass these diagnoses, continue with her steroids, provide her with a sling for a couple of days to settle down the inflammation, but would like her out of it after a few days so that she does not lose her range of motion.  Additionally, discussed conservative treatment measures including rest, ice, elevation, compression, and over-the-counter analgesia as needed and as appropriate.  Risks of NSAID use, steroid use, and muscle relaxers discussed in depth and considered in light of medical comorbidities.  Patient, and parent/guardian as applicable, understand agree with plan.  Follow-up: 3 to 4 weeks  X-rays on follow-up: Consider MRI if not better      Assessment:   Problem List Items Addressed This Visit    None  Visit Diagnoses       Trapezius strain, left, initial encounter    -  Primary    Rotator cuff tendonitis, left        Cervical radiculopathy                    Diagnostics: Reviewed all relevant imaging including x-ray, MRI, CT, and US.      Procedure:  Procedures    Physical Exam:  GENERAL:  No obvious acute distress.  NEURO:  Distally neurovascularly intact.  Sensation intact to light touch.  Extremity: Left shoulder exam shows:  Skin is intact;  No erythema or warmth;  No edema or ecchymosis;  No clinical signs of infection;  Can forward flex to 180 degrees;  Abduction to 180 degrees;  External rotation from neutral at 75 degrees;  Internal rotation at the level of T10;  Weakly POSITIVE signs of impingement with Jansen or Neer's test;  Negative empty can test;  Negative crossarm test;  No pain over the AC joint;  Negative [place]'s test;  Negative sulcus sign;  Negative anterior apprehension's test; and  Neurovascularly intact.    Exam of the cervical spine:  No tenderness along the midline of the cervical spine;  No step-offs along the midline of the cervical spine;  Flexion is full with  no pain;  Extension is full with no pain;  Sidebending is full and symmetric with no pain;  Rotation is full and symmetric with no pain;  Negative Spurling's on the right;  Negative Spurling's on the left;  Full strength and range of motion throughout the bilateral upper extremities; and  Nonantalgic gait.      No orders of the defined types were placed in this encounter.     At the conclusion of the visit there were no further questions by the patient/family regarding their plan of care.  Patient was instructed to call or return with any issues, questions, or concerns regarding their injury and/or treatment plan described above.     03/17/25 at 3:11 PM - Husam Feng, DO    Office: [phone]    This note was prepared using voice recognition software.  The details of this note are correct and have been reviewed, and corrected to the best of my ability.  Some grammatical errors may persist related to the Dragon software.

## 2025-03-18 ENCOUNTER — TREATMENT (OUTPATIENT)
Dept: PHYSICAL THERAPY | Facility: CLINIC | Age: 65
End: 2025-03-18
Payer: COMMERCIAL

## 2025-03-18 DIAGNOSIS — M75.82 ROTATOR CUFF TENDONITIS, LEFT: ICD-10-CM

## 2025-03-18 DIAGNOSIS — S46.812A TRAPEZIUS STRAIN, LEFT, INITIAL ENCOUNTER: ICD-10-CM

## 2025-03-18 DIAGNOSIS — M54.12 CERVICAL RADICULOPATHY: ICD-10-CM

## 2025-03-18 PROCEDURE — 97140 MANUAL THERAPY 1/> REGIONS: CPT | Mod: GP

## 2025-03-18 PROCEDURE — 97110 THERAPEUTIC EXERCISES: CPT | Mod: GP

## 2025-03-18 NOTE — PROGRESS NOTES
"Physical Therapy Treatment      Patient Name: Valentine Calero \"Jaclyn\"  MRN: [id number]  Today's Date: 3/18/2025  Visit #4  Time Calculation  Start Time: 1302  Stop Time: 1346  Time Calculation (min): 44 min    Insurance:  2025 // 0% coins, no ded, $7350 / $71182 oop ($2433.59 / $2433.59 met), $30 copay, 20v donna yr (1v used as of 3/3/2025), no PA  [employer] Choice Plus  Limited to 4 units per visit  20560 - 20561 not covered.    Assessment:  Jaclyn is being treated primarily for cervical radiculopathy as well as rotator cuff tendonitis, and left trapezius strain. Her paresthesias of her left arm continues to limit her ability to complete activities, however pain has decreased significantly in her neck and shoulder. Initiated manual therapy to her cervical spine to improve rotation, as well as create more space for irritated nerve. She will continue to benefit from skilled PT intervention to address this impairment and return to prior functional levels. Treatment includes: manual techniques to decrease pain and improve joint/soft-tissue mobility, as well as exercises to increase strength, endurance, and neuromotor control. Home exercise program has been updated to augment this session.    Plan:  Continue per POC. Add __ next visit.     Current Problem:   1. Rotator cuff tendonitis, left  Follow Up In Physical Therapy      2. Trapezius strain, left, initial encounter  Follow Up In Physical Therapy      3. Cervical radiculopathy  Follow Up In Physical Therapy          Subjective   Jaclyn states her tingling has gone down some, but is still apparent pretty constantly. She is no longer working with sling on and has not noticed any issues thus far. She is in 1-2/10 pain upon arrival.     Objective   Cervical AROM   SB L/R: 28°/31°   Rotation Test Retest L: 44°/54°     Treatments:  PT Therapeutic Procedures Time Entry  Manual Therapy Time Entry: 15  Therapeutic Exercise Time Entry: 29    Therapeutic Exercise " (33323):  HEP review  - Seated Scapular Retraction  - 3 x daily - 7 x weekly - 3 sets - 10 reps - 5s hold  - Seated Cervical Retraction  - 3 x daily - 7 x weekly - 3 sets - 10 reps - 2s hold  - Seated Upper Trapezius Stretch  - 3 x daily - 7 x weekly - 3 sets - 10 reps - 20s hold  - Standing Shoulder Flexion with Dowel  - 3 x daily - 7 x weekly - 3 sets - 10 reps  - Isometric Shoulder Flexion at Wall  - 3 x daily - 7 x weekly - 3 sets - 10 reps - 10s hold  Snag rotation exercise  Cervical extension stretch  Supine punches  Scapular retraction rtb  Trap strength rtb    Manual Therapy (35611):  Cervical medial glide to improve side bending Gr 1-2  Cervical upglide oscillatory mobilization to improve L rotation     Education and discussion on HEP and treatment regarding the benefits related to current condition, POC, pathophysiology, and precautions

## 2025-03-24 ENCOUNTER — APPOINTMENT (OUTPATIENT)
Dept: PHYSICAL THERAPY | Facility: CLINIC | Age: 65
End: 2025-03-24
Payer: COMMERCIAL

## 2025-03-31 ENCOUNTER — TREATMENT (OUTPATIENT)
Dept: PHYSICAL THERAPY | Facility: CLINIC | Age: 65
End: 2025-03-31
Payer: COMMERCIAL

## 2025-03-31 DIAGNOSIS — S46.812A TRAPEZIUS STRAIN, LEFT, INITIAL ENCOUNTER: ICD-10-CM

## 2025-03-31 DIAGNOSIS — M75.82 ROTATOR CUFF TENDONITIS, LEFT: ICD-10-CM

## 2025-03-31 DIAGNOSIS — M54.12 CERVICAL RADICULOPATHY: ICD-10-CM

## 2025-03-31 PROCEDURE — 97110 THERAPEUTIC EXERCISES: CPT | Mod: GP

## 2025-03-31 PROCEDURE — 97140 MANUAL THERAPY 1/> REGIONS: CPT | Mod: GP

## 2025-03-31 NOTE — PROGRESS NOTES
"Physical Therapy Treatment      Patient Name: Valentine Calero \"Jaclyn\"  MRN: [id number]  Today's Date: 3/31/2025  Visit #5  Time Calculation  Start Time: 1103  Stop Time: 1146  Time Calculation (min): 43 min    Insurance:  2025 // 0% coins, no ded, $7350 / $24050 oop ($2433.59 / $2433.59 met), $30 copay, 20v donna yr (1v used as of 3/3/2025), no PA    [employer] Choice Plus  Limited to 4 units per visit  20560 - 20561 not covered.    Assessment:  Jaclyn is being treated primarily for cervical radiculopathy. She demonstrates increased tolerance to exercises without onset of paresthesias.     Pt will continue to benefit from skilled PT interventions to address deficits and return to prior functional levels. Treatment includes: manual techniques to decrease pain and improve joint/soft-tissue mobility, as well as exercises to increase strength, endurance, and neuromotor control. Home exercise program has been updated to augment this session.    Plan:  Continue per POC. Add thoracic mobilizations next visit.     Current Problem:   1. Rotator cuff tendonitis, left  Follow Up In Physical Therapy      2. Trapezius strain, left, initial encounter  Follow Up In Physical Therapy      3. Cervical radiculopathy  Follow Up In Physical Therapy          Subjective   Jaclyn states her symptoms have improved significantly since last visit. She is noticing a decrease in numbness and tingling.     Objective   Increased paresthesias with cervical snag- manual upglide glide of upper cervical vertebrae reduced symptoms  Cervical rotation L 58 degrees  Shoulder flexion L 167 degrees    Treatments:  PT Therapeutic Procedures Time Entry  Manual Therapy Time Entry: 16  Therapeutic Exercise Time Entry: 27    Therapeutic Exercise (43990):  HEP review  - Seated Scapular Retraction  - 3 x daily - 7 x weekly - 3 sets - 10 reps - 5s hold  - Seated Cervical Retraction  - 3 x daily - 7 x weekly - 3 sets - 10 reps - 2s hold  - Seated Upper Trapezius " Stretch  - 3 x daily - 7 x weekly - 3 sets - 10 reps - 20s hold  - Standing Shoulder Flexion with Dowel  - 3 x daily - 7 x weekly - 3 sets - 10 reps  - Isometric Shoulder Flexion at Wall  - 3 x daily - 7 x weekly - 3 sets - 10 reps - 10s hold  Snag rotation exercise  Cervical extension stretch  Supine punches  Scapular retraction rtb  Trap strength rtb with cervical flexion  Resisted ER rtb    Manual Therapy (42022):  Cervical medial glide to improve side bending Gr 1-2  Cervical upglide oscillatory mobilization to improve L rotation     Education and discussion on HEP and treatment regarding the benefits related to current condition, POC, pathophysiology, and precautions    Goals:   Pt will demonstrate compliance and independence to home exercise program.   Pt will increase shoulder flexion AROM to >160 degrees to reach overhead Met 3/31/2025  Pt will increase shoulder strength to >4+/5 to participate in work duties.   Pt will decrease NDI score > 5 points to meet the MCID  Pt will decrease score of Quick DASH by > 8 points to meet the MCID

## 2025-04-03 ENCOUNTER — TREATMENT (OUTPATIENT)
Dept: PHYSICAL THERAPY | Facility: CLINIC | Age: 65
End: 2025-04-03
Payer: COMMERCIAL

## 2025-04-03 DIAGNOSIS — S46.812A TRAPEZIUS STRAIN, LEFT, INITIAL ENCOUNTER: ICD-10-CM

## 2025-04-03 DIAGNOSIS — M75.82 ROTATOR CUFF TENDONITIS, LEFT: ICD-10-CM

## 2025-04-03 DIAGNOSIS — M54.12 CERVICAL RADICULOPATHY: ICD-10-CM

## 2025-04-03 PROCEDURE — 97140 MANUAL THERAPY 1/> REGIONS: CPT | Mod: GP

## 2025-04-03 PROCEDURE — 97110 THERAPEUTIC EXERCISES: CPT | Mod: GP

## 2025-04-03 NOTE — PROGRESS NOTES
"Physical Therapy Treatment      Patient Name: Valentine Calero \"Jaclyn\"  MRN: [id number]  Today's Date: 4/3/2025  Visit #6  Time Calculation  Start Time: 1315  Stop Time: 1400  Time Calculation (min): 45 min    Insurance:  2025 // 0% coins, no ded, $7350 / $17121 oop ($2433.59 / $2433.59 met), $30 copay, 20v donna yr (1v used as of 3/3/2025), no PA    [employer] Choice Plus  Limited to 4 units per visit  20560 - 20561 not covered.    Assessment:  Jaclyn is being treated for cervical pain with radiating pain. Initiated more manual and soft tissue mobilization to improve symptoms. No noted paresthesias throughout today's session.  Pt will continue to benefit from skilled PT interventions to address deficits and return to prior functional levels. Treatment includes: manual techniques to decrease pain and improve joint/soft-tissue mobility, as well as exercises to increase strength, endurance, and neuromotor control. Home exercise program has been updated to augment this session.    Plan:  Continue per POC.     Current Problem:   1. Rotator cuff tendonitis, left  Follow Up In Physical Therapy      2. Trapezius strain, left, initial encounter  Follow Up In Physical Therapy      3. Cervical radiculopathy  Follow Up In Physical Therapy          Subjective   Jaclyn states she is feeling very tight upon arrival. She has increased stiffness on the L side of her neck and is creating difficulty with side bending. She states her numbness and tingling has nearly subsided on her left side.     Objective   Test retest:   L side bend 15 degrees - 30 degrees (grossly)    Treatments:  PT Therapeutic Procedures Time Entry  Manual Therapy Time Entry: 18  Therapeutic Exercise Time Entry: 27    Therapeutic Exercise (71607):  HEP review  - Seated Scapular Retraction  - 3 x daily - 7 x weekly - 3 sets - 10 reps - 5s hold  - Seated Cervical Retraction  - 3 x daily - 7 x weekly - 3 sets - 10 reps - 2s hold  - Seated Upper Trapezius Stretch  - " 3 x daily - 7 x weekly - 3 sets - 10 reps - 20s hold  - Standing Shoulder Flexion with Dowel  - 3 x daily - 7 x weekly - 3 sets - 10 reps  - Isometric Shoulder Flexion at Wall  - 3 x daily - 7 x weekly - 3 sets - 10 reps - 10s hold  Snag rotation exercise  Cervical extension stretch  Supine punches  Scapular retraction rtb  Trap strength rtb with cervical flexion    Manual Therapy (21405):  Cervical medial glide to improve side bending Gr 1-2  Cervical upglide oscillatory mobilization to improve L rotation   STM upper trapezius  Cervical traction + chin tucks    Education and discussion on HEP and treatment regarding the benefits related to current condition, POC, pathophysiology, and precautions

## 2025-04-07 ENCOUNTER — APPOINTMENT (OUTPATIENT)
Dept: PRIMARY CARE | Facility: CLINIC | Age: 65
End: 2025-04-07
Payer: COMMERCIAL

## 2025-04-07 VITALS
TEMPERATURE: 97.4 F | SYSTOLIC BLOOD PRESSURE: 132 MMHG | BODY MASS INDEX: 38.4 KG/M2 | HEIGHT: 65 IN | HEART RATE: 79 BPM | DIASTOLIC BLOOD PRESSURE: 80 MMHG | WEIGHT: 230.5 LBS

## 2025-04-07 DIAGNOSIS — I10 PRIMARY HYPERTENSION: ICD-10-CM

## 2025-04-07 DIAGNOSIS — Z00.00 WELL ADULT HEALTH CHECK: ICD-10-CM

## 2025-04-07 DIAGNOSIS — C53.9 MALIGNANT NEOPLASM OF CERVIX, UNSPECIFIED SITE (MULTI): ICD-10-CM

## 2025-04-07 DIAGNOSIS — I48.91 ATRIAL FIBRILLATION WITH RVR (MULTI): Primary | ICD-10-CM

## 2025-04-07 PROCEDURE — 99213 OFFICE O/P EST LOW 20 MIN: CPT | Performed by: FAMILY MEDICINE

## 2025-04-07 PROCEDURE — 4010F ACE/ARB THERAPY RXD/TAKEN: CPT | Performed by: FAMILY MEDICINE

## 2025-04-07 PROCEDURE — 3079F DIAST BP 80-89 MM HG: CPT | Performed by: FAMILY MEDICINE

## 2025-04-07 PROCEDURE — 1036F TOBACCO NON-USER: CPT | Performed by: FAMILY MEDICINE

## 2025-04-07 PROCEDURE — 3075F SYST BP GE 130 - 139MM HG: CPT | Performed by: FAMILY MEDICINE

## 2025-04-07 PROCEDURE — 3008F BODY MASS INDEX DOCD: CPT | Performed by: FAMILY MEDICINE

## 2025-04-07 NOTE — PROGRESS NOTES
"Elizabeth Calero \"Jaclyn\" is a 64 y.o. female who presents for Follow-up (2mon follow up).    Working at [employer].    Looking for something new.      Doing better on the higher dose of metoprolol.     Shoulder is improving with injection and PT.               Review of Systems    Objective   /80   Pulse 79   Temp 36.3 °C (97.4 °F)   Ht 1.651 m (5' 5\")   Wt 105 kg (230 lb 8 oz)   BMI 38.36 kg/m²     Physical Exam  HENT:      Head: Normocephalic and atraumatic.      Mouth/Throat:      Mouth: Mucous membranes are moist.      Pharynx: Oropharynx is clear.   Eyes:      Extraocular Movements: Extraocular movements intact.      Pupils: Pupils are equal, round, and reactive to light.   Cardiovascular:      Rate and Rhythm: Normal rate and regular rhythm.      Heart sounds: Normal heart sounds.   Pulmonary:      Effort: Pulmonary effort is normal.      Breath sounds: Normal breath sounds.   Musculoskeletal:         General: Normal range of motion.      Cervical back: Normal range of motion.   Lymphadenopathy:      Cervical: No cervical adenopathy.   Skin:     General: Skin is warm and dry.   Neurological:      General: No focal deficit present.      Mental Status: She is alert.   Psychiatric:         Mood and Affect: Mood normal.         Assessment/Plan   Assessment & Plan  Malignant neoplasm of cervix, unspecified site (Multi)  Carcinoma in situ of the cervix 2011.  All follow up paps have been normal.         Atrial fibrillation with RVR (Multi)  On eliquis and B blocker.  Stable.  Seeing cardiology.         Well adult health check  Pap next visit.    Orders:    Follow Up In [facility] - PCP - Health Maintenance; Future    Primary hypertension  Stable.  Continue current meds.                There are no Patient Instructions on file for this visit.  "

## 2025-04-07 NOTE — ASSESSMENT & PLAN NOTE
Pap next visit.    Orders:    Follow Up In [facility] - PCP - Health Maintenance; Future

## 2025-04-09 ENCOUNTER — APPOINTMENT (OUTPATIENT)
Dept: PHYSICAL THERAPY | Facility: CLINIC | Age: 65
End: 2025-04-09
Payer: COMMERCIAL

## 2025-04-14 ENCOUNTER — TREATMENT (OUTPATIENT)
Dept: PHYSICAL THERAPY | Facility: CLINIC | Age: 65
End: 2025-04-14
Payer: COMMERCIAL

## 2025-04-14 ENCOUNTER — APPOINTMENT (OUTPATIENT)
Dept: ORTHOPEDIC SURGERY | Facility: CLINIC | Age: 65
End: 2025-04-14
Payer: COMMERCIAL

## 2025-04-14 DIAGNOSIS — S46.812A TRAPEZIUS STRAIN, LEFT, INITIAL ENCOUNTER: ICD-10-CM

## 2025-04-14 DIAGNOSIS — M54.12 CERVICAL RADICULOPATHY: ICD-10-CM

## 2025-04-14 DIAGNOSIS — M75.82 ROTATOR CUFF TENDONITIS, LEFT: ICD-10-CM

## 2025-04-14 PROCEDURE — 97110 THERAPEUTIC EXERCISES: CPT | Mod: GP

## 2025-04-14 PROCEDURE — 97140 MANUAL THERAPY 1/> REGIONS: CPT | Mod: GP

## 2025-04-14 NOTE — PROGRESS NOTES
"Physical Therapy Treatment      Patient Name: Valentine Calero \"Jaclyn\"  MRN: [id number]  Today's Date: 4/14/2025  Visit #5/6  Time Calculation  Start Time: 0930  Stop Time: 1015  Time Calculation (min): 45 min    Insurance:  2025 // 0% coins, no ded, $7350 / $38087 oop ($2433.59 / $2433.59 met), $30 copay, 20v donna yr (1v used as of 3/3/2025), no PA    [employer] Choice Plus  Limited to 4 units per visit  20560 - 20561 not covered.    Assessment:  Jaclyn is being treated for cervical radiculopathy. Initiated nerve gliding to tolerance. She demonstrates improved strength and mobility but demonstrates an opening restriction of the upper cervical spine with paresthesias into the forearm and hand. We will reassess symptoms next visit and may discharge to home management depending on assessment.     Pt will continue to benefit from skilled PT interventions to address deficits and return to prior functional levels. Treatment includes: manual techniques to decrease pain and improve joint/soft-tissue mobility, as well as exercises to increase strength, endurance, and neuromotor control. Home exercise program has been updated to augment this session.    Plan:  Continue per POC. Plan to [facility].     Current Problem:   1. Rotator cuff tendonitis, left  Follow Up In Physical Therapy      2. Trapezius strain, left, initial encounter  Follow Up In Physical Therapy      3. Cervical radiculopathy  Follow Up In Physical Therapy          Subjective   Jaclyn states she is having a little bit of numbness and tingling into the left arm today.     Objective   Cervical AROM  Rotation L/R: 22 degrees, 25 degrees  Side Bend L/R: 55 degrees, 65 degrees  Shoulder ROM WFL    Treatments:  PT Therapeutic Procedures Time Entry  Manual Therapy Time Entry: 25  Neuromuscular Re-Education Time Entry: 5  Therapeutic Exercise Time Entry: 15    Therapeutic Exercise (81743):  HEP review  - Seated Scapular Retraction  - 3 x daily - 7 x weekly - 3 sets - " 10 reps - 5s hold  - Seated Cervical Retraction  - 3 x daily - 7 x weekly - 3 sets - 10 reps - 2s hold  - Seated Upper Trapezius Stretch  - 3 x daily - 7 x weekly - 3 sets - 10 reps - 20s hold  - Standing Shoulder Flexion with Dowel  - 3 x daily - 7 x weekly - 3 sets - 10 reps  - Isometric Shoulder Flexion at Wall  - 3 x daily - 7 x weekly - 3 sets - 10 reps - 10s hold  UBE  Scapular retractions  Cervical chin tucks   Pulley abduction    Manual Therapy (09404):  Cervical medial glide to improve side bending Gr 1-2  Cervical upglide oscillatory mobilization to improve L rotation   STM upper trapezius  Cervical traction + chin tucks    Neuromuscular Re-education (80206):  Median nerve glide  Ulnar nerve glide    Education and discussion on HEP and treatment regarding the benefits related to current condition, POC, pathophysiology, and precautions

## 2025-04-15 ENCOUNTER — APPOINTMENT (OUTPATIENT)
Dept: CARDIOLOGY | Facility: CLINIC | Age: 65
End: 2025-04-15
Payer: COMMERCIAL

## 2025-04-15 VITALS
HEART RATE: 72 BPM | DIASTOLIC BLOOD PRESSURE: 72 MMHG | BODY MASS INDEX: 38.55 KG/M2 | SYSTOLIC BLOOD PRESSURE: 134 MMHG | WEIGHT: 231.4 LBS | HEIGHT: 65 IN

## 2025-04-15 DIAGNOSIS — I48.91 ATRIAL FIBRILLATION WITH RVR (MULTI): Primary | ICD-10-CM

## 2025-04-15 PROCEDURE — 3075F SYST BP GE 130 - 139MM HG: CPT | Performed by: INTERNAL MEDICINE

## 2025-04-15 PROCEDURE — 4010F ACE/ARB THERAPY RXD/TAKEN: CPT | Performed by: INTERNAL MEDICINE

## 2025-04-15 PROCEDURE — 3078F DIAST BP <80 MM HG: CPT | Performed by: INTERNAL MEDICINE

## 2025-04-15 PROCEDURE — G2211 COMPLEX E/M VISIT ADD ON: HCPCS | Performed by: INTERNAL MEDICINE

## 2025-04-15 PROCEDURE — 3044F HG A1C LEVEL LT 7.0%: CPT | Performed by: INTERNAL MEDICINE

## 2025-04-15 PROCEDURE — 99204 OFFICE O/P NEW MOD 45 MIN: CPT | Performed by: INTERNAL MEDICINE

## 2025-04-15 PROCEDURE — 3008F BODY MASS INDEX DOCD: CPT | Performed by: INTERNAL MEDICINE

## 2025-04-15 PROCEDURE — 93000 ELECTROCARDIOGRAM COMPLETE: CPT | Performed by: INTERNAL MEDICINE

## 2025-04-15 NOTE — PROGRESS NOTES
Referring Provider: Mp Harper *  Reason for Consult: New onset atrial fibrillation    History of Present Illness:      Valentine Calero is a 64 y.o. year old female patient with a history significant for type 2 diabetes, hypertension, GRISELDA on CPAP, newly diagnosed paroxysmal atrial fibrillation who is referred by Dr. Harper for atrial fibrillation.    Found to have AF at PCPs office in 2025 with heart rates in the 150s.  Patient was started on metoprolol succinate and Eliquis at that time.  TSH was within normal limits.  She then had another episode of atrial fibrillation after a fire alarm at work when off, and ended up having to go to the emergency room at [facility].  She received IV fluids and IV metoprolol, which successfully transition her back to normal sinus rhythm.  Since then, she has been switched to metoprolol tartrate 100 mg twice daily and has been to normal sinus rhythm.  She is symptomatic while in atrial fibrillation now, with palpitations and fatigue.      Focused Cardiovascular Problem List:  Type 2 DM  Hypertension  GRISELDA on CPAP  Symptomatic paroxysmal atrial fibrillation: [id number] equals 3 (female, hypertension, diabetes).  On Eliquis and metoprolol.  Right bundle branch block      Past Medical and Surgical History:  Ms. Calero  has a past medical history of Abnormal ECG, Anxiety, Atrial fibrillation (Multi), Cancer (Multi), Diabetes mellitus (Multi), Encounter for screening for malignant neoplasm of vagina, Hypertension, Other conditions influencing health status, Personal history of other medical treatment, S/P hysterectomy (2011), and Sleep apnea.    has a past surgical history that includes Other surgical history (2019); Other surgical history (2019); Breast surgery (Bilateral); Breast biopsy (Right, ); Breast biopsy (Right, ); Hysterectomy; Cholecystectomy; [place] tooth extraction; and  section, low  transverse.    Social History:  Social History     Tobacco Use    Smoking status: Never    Smokeless tobacco: Never   Substance Use Topics    Alcohol use: Not Currently     Comment: Occasional User        Relevant Family History:   Family History   Problem Relation Name Age of Onset    Hyperlipidemia Mother Josette     Miscarriages / Stillbirths Mother Josette     Vision loss Mother Josette     Alcohol abuse Father Rashad     Heart disease Father Rashad     Hypertension Father Rashad     Arthritis Father's Sister Marlyn     Atrial fibrillation Father's Sister Marlyn     Diabetes Maternal Grandmother Felecia Walters     Diabetes type II Maternal Grandmother Felecia Walters     Arthritis Paternal Grandmother Hanh Nelson     Stroke Paternal Grandmother Hanh Nelson         Allergies:  No Known Allergies     Medications:  Current Outpatient Medications   Medication Instructions    apixaban (ELIQUIS) 5 mg, oral, 2 times daily    ergocalciferol (VITAMIN D-2) 1,250 mcg, oral, Once Weekly, After 3 months, reduce to once a month.    hydroCHLOROthiazide (HYDRODIURIL) 25 mg, oral, Daily    lisinopril 40 mg, oral, Daily before breakfast    loratadine (CLARITIN) 10 mg, oral, Daily    magnesium oxide (MAG-OX) 400 mg, oral, Daily    metFORMIN  mg 24 hr tablet Take 2 tabs BID.  Do not crush, chew, or split.    metoprolol tartrate (LOPRESSOR) 100 mg, oral, 2 times daily    PARoxetine (PAXIL) 20 mg, oral, Daily    rosuvastatin (CRESTOR) 5 mg, oral, Daily    semaglutide (RYBELSUS) 14 mg, oral, Daily    vit C/E/zinc/lutein/zeaxanthin (OCUVITE EYE HEALTH ORAL) 1 tablet, Daily         Objective   Physical Exam:  Last Recorded Vitals:      9/5/2024     8:57 AM 1/6/2025     2:12 PM 1/27/2025     4:04 PM 2/4/2025    11:50 AM 2/20/2025     9:49 AM 4/7/2025     2:35 PM 4/15/2025     8:37 AM   Vitals   Systolic 158 130 158 143 138 132 134   Diastolic 89 80 98 98 90 80 72   BP Location   Left arm    Left arm   Heart Rate 72 89 76 78  79 72   Temp 36.2 °C  "(97.2 °F) 36.4 °C (97.6 °F)  36.2 °C (97.2 °F) 36.8 °C (98.2 °F) 36.3 °C (97.4 °F)    Resp     16     Height  1.651 m (5' 5\") 1.651 m (5' 5\")   1.651 m (5' 5\") 1.651 m (5' 5\")   Weight (lb) 231.5 233.38 232 227.25 229 230.5 231.4   BMI 38.52 kg/m2 38.84 kg/m2 38.61 kg/m2 37.82 kg/m2 38.11 kg/m2 38.36 kg/m2 38.51 kg/m2   BSA (m2) 2.19 m2 2.2 m2 2.19 m2 2.17 m2 2.18 m2 2.19 m2 2.19 m2   Visit Report Report Report Report Report Report Report Report    Visit Vitals  /72 (BP Location: Left arm, Patient Position: Sitting)   Pulse 72   Ht 1.651 m (5' 5\")   Wt 105 kg (231 lb 6.4 oz)   BMI 38.51 kg/m²   OB Status Postmenopausal   Smoking Status Never   BSA 2.19 m²      Gen: NAD, sitting comfortably  HEENT: NC/AT  Card: RRR, no m/r/g  Pulm: Clear to auscultation bilaterally  Ext: No LE edema  Neuro: No focal deficits    Diagnostic Results      My Independent nterpretation of Reviewed Study(s):  Prior ECGs (reviewed and my interpretation):   4/15/2025: Normal sinus rhythm, right bundle branch block    Echocardiography:  3/10/2025: LVEF 60%, normal left atrial size           Relevant Labs:  Lab Results   Component Value Date    CREATININE 0.67 02/17/2025    CREATININE 0.63 07/11/2023    CREATININE 0.72 01/20/2023    K 4.2 02/17/2025    K 3.9 07/11/2023    K 3.8 01/20/2023    HGBA1C 6.7 (A) 01/06/2025    HGBA1C 6.5 05/13/2024    HGBA1C 7.7 (A) 11/06/2023    HGB 15.8 01/20/2023    HGB 15.2 07/27/2022    HGB 15.7 12/11/2020    AST 17 02/17/2025    AST 29 07/11/2023    AST 34 01/20/2023    ALT 21 02/17/2025    ALT 28 07/11/2023    ALT 31 01/20/2023     TSH 3.75     Assessment/Plan   Assessment and Plan:  Valentine Calero is a 64 y.o. year old female patient who is referred for management and evaluation of symptomatic paroxysmal atrial fibrillation. Since being switched to metoprolol 100mg twice daily her episodes have been under much better control.     We talked in depth about treatment options including ablation versus " antiarrhythmic drug therapy to prevent further episodes of atrial fibrillation. S/he prefers a trial of antiarrhythmic drug therapies before considering ablation.     #Symptomatic paroxysmal atrial fibrillation  - Continue apixaban 5mg twice daily  - Continue metoprolol tartrate 100mg BID  - Stress echocardiogram, RBBB may reduce specificity of treadmill test alone  - If normal stress echo,would start low-dose flecainide 50mg twice daily           Return to Clinic: Patient should return to the EP Clinic in 2 months     Thank you very much for allowing me to participate in the care of this patient. Please do not hesitate to contact me with any further questions or concerns.    Daniela Sierra MD  Clinical Cardiac Electrophysiologist, [facility] [place]    of Medicine, [facility] [employer]  Director of Atrial Fibrillation Ablation, [facility]  Director of Ventricular Arrhythmias Research, [facility]  Office Phone Number: [phone]

## 2025-04-16 ENCOUNTER — OFFICE VISIT (OUTPATIENT)
Dept: ORTHOPEDIC SURGERY | Facility: CLINIC | Age: 65
End: 2025-04-16
Payer: COMMERCIAL

## 2025-04-16 DIAGNOSIS — M54.12 CERVICAL RADICULOPATHY: Primary | ICD-10-CM

## 2025-04-16 DIAGNOSIS — M75.82 ROTATOR CUFF TENDONITIS, LEFT: ICD-10-CM

## 2025-04-16 PROCEDURE — 4010F ACE/ARB THERAPY RXD/TAKEN: CPT | Performed by: STUDENT IN AN ORGANIZED HEALTH CARE EDUCATION/TRAINING PROGRAM

## 2025-04-16 PROCEDURE — 3044F HG A1C LEVEL LT 7.0%: CPT | Performed by: STUDENT IN AN ORGANIZED HEALTH CARE EDUCATION/TRAINING PROGRAM

## 2025-04-16 PROCEDURE — 99213 OFFICE O/P EST LOW 20 MIN: CPT | Performed by: STUDENT IN AN ORGANIZED HEALTH CARE EDUCATION/TRAINING PROGRAM

## 2025-04-16 RX ORDER — DIAZEPAM 5 MG/1
TABLET ORAL
Qty: 2 TABLET | Refills: 0 | Status: SHIPPED | OUTPATIENT
Start: 2025-04-16

## 2025-04-16 NOTE — PROGRESS NOTES
"  Acute Injury New Patient Visit    HPI: Valentine \"Augusto" is a 64 y.o.female who presents today for follow-up of her left rotator cuff tendinitis, left trapezius strain, left cervical radiculopathy.  She states that she is better overall, but particularly at the shoulder.  The injection is still providing some relief.  However, she is still having some pins-and-needles feeling into her left hand, but no longer going into the fingers.  She feels this both ventrally and dorsally.  She states that she feels it radiate all the way down to the hand.  She denies any interval falls or injuries.  She denies any current neck pain.  She is continuing her physical therapy and has her last one next week.  This has helped with her pain.  She does not have much pain at this time.  She continues to do her home exercises.  She has been using Tylenol.  She did have a steroid pack from her PCP back in February.  This seemed to help a little.    On 3/17/2025, she presented for follow-up of her left rotator cuff tendinitis, left trapezius strain, left cervical radiculopathy.  She states that she is about 90% better after her subacromial bursa injection on 2/28/2025.  States that physical therapy is going well.  However, she is still having some pins and needle feeling in her hand on the left, she states all fingers.  She denies any interval falls or injuries.    On 2/28/2025, she presented for follow-up of her left rotator cuff tendinitis, left trapezius strain, left cervical radiculopathy.  She states that she is not getting any relief with her steroids and cannot take NSAIDs.  She would like to try a corticosteroid injection today.  Denies any interval falls or injuries.  Denies any new symptoms.  Still having pain primarily into the shoulder, worse when she is trying to sleep.    2/24/2025, she presented with new complaints of left shoulder pain.  She is right-hand dominant.  For about 6 months she has been having issues with her " left shoulder.  The pain is anterior.  She states that certain movements make it worse.  She is also having some numbness and tingling all the way down to her hand.  She had a shot of Toradol at her PCP which seemed to help.  She also started physical therapy for rotator cuff tendinitis.  She was doing better.  She denies any interval falls or injuries.  The Medrol Dosepak helped initially, but she was placed on 1 on 2/20/2025, which is not helping it.  She is also on Flexeril which helps.    Plan: Today, on 4/16/2025, for her left cervical radiculopathy, will obtain an MRI of the cervical spine, providing her with Valium to tolerate a closed MRI and will follow-up with results.    On 3/17/2025, we will continue physical therapy and home exercises, and will consider an MRI of the cervical spine if she is not better and still having some paresthesias.    On 2/28/2025, proceeded with a corticosteroid injection of the subacromial space which she tolerated well without complications.  Postinjection instructions given.    On 2/24/2025, for this left rotator cuff tendinitis, left trapezius strain, and left cervical radiculopathy, we will order PT to encompass these diagnoses, continue with her steroids, provide her with a sling for a couple of days to settle down the inflammation, but would like her out of it after a few days so that she does not lose her range of motion.  Additionally, discussed conservative treatment measures including rest, ice, elevation, compression, and over-the-counter analgesia as needed and as appropriate.  Risks of NSAID use, steroid use, and muscle relaxers discussed in depth and considered in light of medical comorbidities.  Patient, and parent/guardian as applicable, understand agree with plan.  Follow-up: With MRI results  X-rays on follow-up: None      Assessment:   Problem List Items Addressed This Visit    None  Visit Diagnoses         Cervical radiculopathy    -  Primary    Relevant  Medications    diazePAM (Valium) 5 mg tablet    Other Relevant Orders    MR cervical spine wo IV contrast      Rotator cuff tendonitis, left                      Diagnostics: Reviewed all relevant imaging including x-ray, MRI, CT, and US.      Procedure:  Procedures    Physical Exam:  GENERAL:  No obvious acute distress.  NEURO:  Distally neurovascularly intact.  Sensation intact to light touch.  Extremity: Left shoulder exam shows:  Skin is intact;  No erythema or warmth;  No edema or ecchymosis;  No clinical signs of infection;  Can forward flex to 180 degrees;  Abduction to 180 degrees;  External rotation from neutral at 75 degrees;  Internal rotation at the level of T10;  Weakly POSITIVE signs of impingement with Jansen or Neer's test;  Negative empty can test;  Negative crossarm test;  No pain over the AC joint;  Negative Erath's test;  Negative sulcus sign;  Negative anterior apprehension's test; and  Neurovascularly intact.    Exam of the cervical spine:  No tenderness along the midline of the cervical spine;  No step-offs along the midline of the cervical spine;  Flexion is full with no pain;  Extension is full with no pain;  Sidebending is full and symmetric with no pain;  Rotation is full and symmetric with no pain;  Negative Spurling's on the right;  Positive today Spurling's on the left;  Full strength and range of motion throughout the bilateral upper extremities; and  Nonantalgic gait.      Orders Placed This Encounter    MR cervical spine wo IV contrast    diazePAM (Valium) 5 mg tablet      At the conclusion of the visit there were no further questions by the patient/family regarding their plan of care.  Patient was instructed to call or return with any issues, questions, or concerns regarding their injury and/or treatment plan described above.     04/16/25 at 11:53 AM - Husam Feng DO    Office: [phone]    This note was prepared using voice recognition software.  The details of this  note are correct and have been reviewed, and corrected to the best of my ability.  Some grammatical errors may persist related to the Dragon software.

## 2025-04-21 ENCOUNTER — APPOINTMENT (OUTPATIENT)
Dept: PHYSICAL THERAPY | Facility: CLINIC | Age: 65
End: 2025-04-21
Payer: COMMERCIAL

## 2025-04-21 ENCOUNTER — TELEPHONE (OUTPATIENT)
Dept: CARDIOLOGY | Facility: CLINIC | Age: 65
End: 2025-04-21
Payer: COMMERCIAL

## 2025-04-21 NOTE — TELEPHONE ENCOUNTER
Patient called and LM stating she received a letter from her insurance stating they need more information to be able to approve the stress test. Routed to Daniela Sierra MD

## 2025-04-23 ENCOUNTER — TREATMENT (OUTPATIENT)
Dept: PHYSICAL THERAPY | Facility: CLINIC | Age: 65
End: 2025-04-23
Payer: COMMERCIAL

## 2025-04-23 DIAGNOSIS — S46.812A TRAPEZIUS STRAIN, LEFT, INITIAL ENCOUNTER: ICD-10-CM

## 2025-04-23 DIAGNOSIS — M54.12 CERVICAL RADICULOPATHY: ICD-10-CM

## 2025-04-23 DIAGNOSIS — M75.82 ROTATOR CUFF TENDONITIS, LEFT: ICD-10-CM

## 2025-04-23 PROCEDURE — 97110 THERAPEUTIC EXERCISES: CPT | Mod: GP

## 2025-04-23 PROCEDURE — 97140 MANUAL THERAPY 1/> REGIONS: CPT | Mod: GP

## 2025-04-23 NOTE — PROGRESS NOTES
"  Physical Therapy Treatment      Patient Name: Valentine Calero \"Jaclyn\"  MRN: [id number]  Today's Date: 4/23/2025  Visit #6  Time Calculation  Start Time: 0915  Stop Time: 1000  Time Calculation (min): 45 min    Insurance:  2025 // 0% coins, no ded, $7350 / $01037 oop ($2433.59 / $2433.59 met), $30 copay, 20v donna yr (1v used as of 3/3/2025), no PA    [employer] Choice Plus  Limited to 4 units per visit  20560 - 20561 not covered.    Assessment:  Jaclyn is being treated for cervical radiculopathy as well as left shoulder pain and trapezius strain. She demonstrates improvements with all shoulder and neck mobility as well as upper extremity strength and cervical stability. She is scheduled for MRI 5/2/2025. Pt will be placed on a 30 day hold if MRI indicates further therapeutic interventions are necessary. Plan to DC to home management for time being.     Plan:  DC to home management    Current Problem:   1. Rotator cuff tendonitis, left  Follow Up In Physical Therapy      2. Trapezius strain, left, initial encounter  Follow Up In Physical Therapy      3. Cervical radiculopathy  Follow Up In Physical Therapy          Subjective   Jaclyn states     Objective   Observation and Posture:   Forward head posture with rounded shoulders  Kyphotic Angle: 15 degrees     Functional Assessment:  Hand Behind Neck: C5  Hand Behind Back: T12     Palpation and Joint Mobility:  Empty end feel with all joint mobility  Tender to palpation along L upper trapezius with mild fibrosis noted; tenderness along medial border of scapula      Cervical AROM  Flexion: 40°  Extension: 70°  Rotation L/R: 60°/65°  Side Bending L/R: 28°/35°     Shoulder AROM L  Flexion: 170°  Abduction: 170°  External Rotation: 90°  Internal rotation: 80°  *Passive full     Shoulder MMT L  Upper trapezius: 5/5   Middle trapezius: 4/5   Lower trapezius: 4/5   Serratus anterior: 4/5     Elbow Clearing: WFL     UQ Neuro Screen  Cervical Myotomes (L/R)  C4 Scapular " elevation: 5/5, 5/5  C5 Shoulder abduction: 5/5, 5/5  C6 Elbow flexion: 5/5, 5/5  C7 Elbow extension: 5/5, 5/5  C8 Thumb extension: 5/5,5/5     Dermatomes intact BUE     Scapular position: No visible scapular winging     QuickDASH Score: 14%  NDI Score: 12%    Goals:   Pt will demonstrate compliance and independence to home exercise program. MET 4/23/2025  Pt will increase shoulder flexion AROM to >160 degrees to reach overhead MET 4/23/2025  Pt will increase shoulder strength to >4+/5 to participate in work duties. MET 4/23/2025  Pt will decrease NDI score > 5 points to meet the MCID MET 4/23/2025  Pt will decrease score of Quick DASH by > 8 points to meet the MCID MET 4/23/2025    Treatments:  PT Therapeutic Procedures Time Entry  Manual Therapy Time Entry: 20  Therapeutic Exercise Time Entry: 25    Therapeutic Exercise (56754):  HEP review    Assessment: See objective findings    Manual Therapy (16321):  Cervical medial glide to improve side bending Gr 1-2  Cervical upglide oscillatory mobilization to improve L rotation   STM upper trapezius    Education and discussion on HEP and treatment regarding the benefits related to current condition, POC, pathophysiology, and precautions

## 2025-04-24 ENCOUNTER — APPOINTMENT (OUTPATIENT)
Dept: PHYSICAL THERAPY | Facility: CLINIC | Age: 65
End: 2025-04-24
Payer: COMMERCIAL

## 2025-05-01 ENCOUNTER — HOSPITAL ENCOUNTER (OUTPATIENT)
Dept: CARDIOLOGY | Facility: HOSPITAL | Age: 65
Discharge: HOME | End: 2025-05-01
Payer: COMMERCIAL

## 2025-05-01 DIAGNOSIS — I48.19 OTHER PERSISTENT ATRIAL FIBRILLATION: ICD-10-CM

## 2025-05-01 DIAGNOSIS — I48.91 ATRIAL FIBRILLATION WITH RVR (MULTI): ICD-10-CM

## 2025-05-01 PROCEDURE — 2500000004 HC RX 250 GENERAL PHARMACY W/ HCPCS (ALT 636 FOR OP/ED): Mod: JZ | Performed by: INTERNAL MEDICINE

## 2025-05-01 PROCEDURE — 93016 CV STRESS TEST SUPVJ ONLY: CPT | Performed by: INTERNAL MEDICINE

## 2025-05-01 PROCEDURE — 93350 STRESS TTE ONLY: CPT | Performed by: INTERNAL MEDICINE

## 2025-05-01 PROCEDURE — 93017 CV STRESS TEST TRACING ONLY: CPT

## 2025-05-01 PROCEDURE — 93018 CV STRESS TEST I&R ONLY: CPT | Performed by: INTERNAL MEDICINE

## 2025-05-01 RX ADMIN — PERFLUTREN 7 ML OF DILUTION: 6.52 INJECTION, SUSPENSION INTRAVENOUS at 10:20

## 2025-05-02 ENCOUNTER — APPOINTMENT (OUTPATIENT)
Dept: RADIOLOGY | Facility: HOSPITAL | Age: 65
End: 2025-05-02
Payer: COMMERCIAL

## 2025-05-02 DIAGNOSIS — M54.12 CERVICAL RADICULOPATHY: ICD-10-CM

## 2025-05-02 PROCEDURE — 72141 MRI NECK SPINE W/O DYE: CPT

## 2025-05-09 ENCOUNTER — OFFICE VISIT (OUTPATIENT)
Dept: ORTHOPEDIC SURGERY | Facility: CLINIC | Age: 65
End: 2025-05-09
Payer: COMMERCIAL

## 2025-05-09 DIAGNOSIS — M54.12 CERVICAL RADICULOPATHY: Primary | ICD-10-CM

## 2025-05-09 NOTE — PROGRESS NOTES
"Valentine Calero \"Augusto" is a 64 y.o. female who presents for New Patient Visit of the Neck (Into left arm/Sent by Dr. Feng/X-rays and MRI done at ).    HPI:  64-year-old female here for new patient evaluation and left arm pain and neck pain.  Sent by Dr. Shah.  She denies any fever chills nausea vomiting night sweats.  She has no bowel or bladder complaints.    Physical exam:  Well-nourished, well kept.  No lymphangitis or lymphadenopathy in the examined extremities. Affect normal.  Alert and oriented X 3.  Coordination normal.  Patient can rise from a seated position, can sit from a standing position. Can stand on heels and toes.  Patient is non-tender in the paraspinal musculature of the cervical spine. range of motion is intact. examination of the upper extremities reveals no point tenderness, swelling, or deformity.  Range of motion of the shoulders, elbows, wrists, and fingers are full without crepitance, instability, or exacerbation of pain. Strength is 5/5 throughout, except I get some weakness in her left bicep flexion and some weakness in her left wrist extension both are about 4+/5. no redness, abrasions, or lesions on the upper extremities bilaterally.  Gross sensation intact to the extremities.  Deep tendon reflexes 2+ and symmetric bilaterally.  Ford negative.     Imaging studies:  We reviewed an MRI of the cervical spine from May 2, 2025.  X-rays of the cervical spine from September 5, 2024 were reviewed.  A DEXA bone density scan from January 13, 2025 was reviewed.    Assessment:  64-year-old female here for evaluation of mostly left arm radicular symptoms.  Sent by Dr. Shah.  He has been having symptoms for about 6 months.  She has been treated by Dr. Shah for a left rotator cuff tear which seems to be stable.  She was not describing any neck pain.  She has a little bit of trapezius ache on the left but she is mostly getting numbness and tingling down the left arm and into the left hand " and into the fingers and no specific pattern.  Her MRI was reviewed and she has moderate to severe stenosis centrally and foraminally at C4-5, C5-6, C6-7.  Some mild foraminal left greater than right stenosis at C3-4.  An incidental finding of a enlarged thyroid node was made on the MRI, the patient was advised to follow-up with her primary care regarding this.  She does not want to consider any surgery at this point.  An operation on her would most likely be a C4-5, C5-6, C6-7 ACDF.  She does have moderate to severe central stenosis at those levels, I did caution the patient that if she were to get into any kind of traumatic accident back was involved it could lead to further issues and potentially damage to her spinal cord.  She does understand that.  She would like to continue with physical therapy, she thinks that is helping.    Plan:  We can get her into some physical therapy again, something with a manual component and modalities and we will see her back on an as-needed basis.  Again, if we were to do an operation on her would most likely be a C4-5, C5-6, C6-7 ACDF.    I have reviewed tests today, x-rays, MRI, DEXA bone scan.  I reviewed the notes from Dr. Shah from April 16, 2025.  This is a patient with an exacerbation of a chronic problem that poses a threat to bodily function.  We did consider and discussed surgery today.    Fermín Aggarwal PA-C

## 2025-05-13 DIAGNOSIS — E04.1 THYROID NODULE INCIDENTALLY NOTED ON IMAGING STUDY: Primary | ICD-10-CM

## 2025-05-14 DIAGNOSIS — I10 PRIMARY HYPERTENSION: ICD-10-CM

## 2025-05-15 RX ORDER — HYDROCHLOROTHIAZIDE 25 MG/1
25 TABLET ORAL DAILY
Qty: 90 TABLET | Refills: 3 | Status: SHIPPED | OUTPATIENT
Start: 2025-05-15

## 2025-05-27 ENCOUNTER — APPOINTMENT (OUTPATIENT)
Dept: RADIOLOGY | Facility: HOSPITAL | Age: 65
End: 2025-05-27
Payer: COMMERCIAL

## 2025-05-27 DIAGNOSIS — E11.8 TYPE 2 DIABETES MELLITUS WITH COMPLICATION, WITHOUT LONG-TERM CURRENT USE OF INSULIN (MULTI): ICD-10-CM

## 2025-05-27 DIAGNOSIS — E04.1 THYROID NODULE INCIDENTALLY NOTED ON IMAGING STUDY: ICD-10-CM

## 2025-05-27 PROCEDURE — 76536 US EXAM OF HEAD AND NECK: CPT

## 2025-05-27 PROCEDURE — 76536 US EXAM OF HEAD AND NECK: CPT | Performed by: RADIOLOGY

## 2025-05-28 RX ORDER — ORAL SEMAGLUTIDE 14 MG/1
14 TABLET ORAL DAILY
Qty: 90 TABLET | Refills: 0 | Status: SHIPPED | OUTPATIENT
Start: 2025-05-28

## 2025-06-05 ENCOUNTER — APPOINTMENT (OUTPATIENT)
Dept: PRIMARY CARE | Facility: CLINIC | Age: 65
End: 2025-06-05
Payer: COMMERCIAL

## 2025-06-09 DIAGNOSIS — I48.91 ATRIAL FIBRILLATION WITH RVR (MULTI): ICD-10-CM

## 2025-06-09 RX ORDER — METOPROLOL TARTRATE 100 MG/1
100 TABLET ORAL 2 TIMES DAILY
Qty: 60 TABLET | Refills: 3 | Status: SHIPPED | OUTPATIENT
Start: 2025-06-09

## 2025-06-10 DIAGNOSIS — E04.1 THYROID NODULE: Primary | ICD-10-CM

## 2025-06-13 ENCOUNTER — APPOINTMENT (OUTPATIENT)
Dept: PRIMARY CARE | Facility: CLINIC | Age: 65
End: 2025-06-13
Payer: COMMERCIAL

## 2025-06-13 VITALS
TEMPERATURE: 97.4 F | HEIGHT: 65 IN | SYSTOLIC BLOOD PRESSURE: 110 MMHG | BODY MASS INDEX: 39.4 KG/M2 | WEIGHT: 236.5 LBS | DIASTOLIC BLOOD PRESSURE: 76 MMHG | HEART RATE: 100 BPM

## 2025-06-13 DIAGNOSIS — M48.02 CERVICAL STENOSIS OF SPINE: Primary | ICD-10-CM

## 2025-06-13 DIAGNOSIS — E04.1 THYROID NODULE: ICD-10-CM

## 2025-06-13 PROCEDURE — 3074F SYST BP LT 130 MM HG: CPT | Performed by: FAMILY MEDICINE

## 2025-06-13 PROCEDURE — 3078F DIAST BP <80 MM HG: CPT | Performed by: FAMILY MEDICINE

## 2025-06-13 PROCEDURE — 1036F TOBACCO NON-USER: CPT | Performed by: FAMILY MEDICINE

## 2025-06-13 PROCEDURE — 4010F ACE/ARB THERAPY RXD/TAKEN: CPT | Performed by: FAMILY MEDICINE

## 2025-06-13 PROCEDURE — 3008F BODY MASS INDEX DOCD: CPT | Performed by: FAMILY MEDICINE

## 2025-06-13 PROCEDURE — 99214 OFFICE O/P EST MOD 30 MIN: CPT | Performed by: FAMILY MEDICINE

## 2025-06-13 PROCEDURE — 3044F HG A1C LEVEL LT 7.0%: CPT | Performed by: FAMILY MEDICINE

## 2025-06-13 NOTE — PROGRESS NOTES
"Elizabeth Calero \"Jaclyn\" is a 64 y.o. female who presents for Follow-up (Follow up to MRI).    Angelina is here to follow-up on her neck MRI.  She was having some pain in her left shoulder down her arm and some numbness and tingling in her arm for which she saw a sports med doctor who ordered an MRI.  On the MRI she was found to have severe stenosis at multiple levels C5-6 and 7 made worse by disc degeneration.  She was advised to see a neurosurgeon as soon as possible for multilevel surgery of the cervical spine.  At this time she is reluctant to have surgery because she just started a new job and she cannot get any time off until 2026.  If possible she would prefer to wait until then.  She currently denies any weakness in the arm but does have intermittent numbness and tingling in her left arm    Also of note her MRI showed thyroid nodules for which we ordered thyroid ultrasound.  She had 3 large nodules 2 on the right 1 on the left that were all TI-RADS 4 needing FNA.  I reviewed this data with her and I am sending her to ENT for the biopsy.  She denies any symptoms of thyroid disorder         Review of Systems    Objective   /76   Pulse 100   Temp 36.3 °C (97.4 °F) (Tympanic)   Ht 1.651 m (5' 5\")   Wt 107 kg (236 lb 8 oz)   BMI 39.36 kg/m²     Physical Exam  HENT:      Head: Normocephalic and atraumatic.      Mouth/Throat:      Mouth: Mucous membranes are moist.      Pharynx: Oropharynx is clear.   Eyes:      Extraocular Movements: Extraocular movements intact.      Pupils: Pupils are equal, round, and reactive to light.   Neck:      Comments: Thyroid enlarged on exam without specific nodules palpable by me.  It is nontender  Cardiovascular:      Rate and Rhythm: Normal rate and regular rhythm.      Heart sounds: Normal heart sounds.   Pulmonary:      Effort: Pulmonary effort is normal.      Breath sounds: Normal breath sounds.   Musculoskeletal:         General: Normal range of motion.      " Cervical back: Normal range of motion.      Comments: Motor of bilateral upper extremities is symmetric and 5 out of 5   Lymphadenopathy:      Cervical: No cervical adenopathy.   Skin:     General: Skin is warm and dry.   Neurological:      General: No focal deficit present.      Mental Status: She is alert.   Psychiatric:         Mood and Affect: Mood normal.         Assessment/Plan   Assessment & Plan  Cervical stenosis of spine  She would like to postpone this surgery until 2026 if possible.  I have encouraged her to talk to her manager and explain her situation in the event her symptoms start to worsen she may need surgery in a more emergent fashion.  She is also been advised of the risks of worsening injury if she had any neck injury.  She is already currently doing physical therapy       Thyroid nodule  I reviewed the ultrasound at length with her.  I explained the need for the FNA.  Will refer to ENT for further management  Of note thyroid function was recently tested and normal  Orders:    Referral to ENT; Future     Minutes spent with patient , counseling, chart review and in coordination of care:  >25    Please keep your previously scheduled follow up appointment.  Nice to see you today!      There are no Patient Instructions on file for this visit.

## 2025-06-17 ENCOUNTER — APPOINTMENT (OUTPATIENT)
Dept: CARDIOLOGY | Facility: CLINIC | Age: 65
End: 2025-06-17
Payer: COMMERCIAL

## 2025-07-07 DIAGNOSIS — I48.91 ATRIAL FIBRILLATION WITH RVR (MULTI): ICD-10-CM

## 2025-07-08 ENCOUNTER — APPOINTMENT (OUTPATIENT)
Dept: CARDIOLOGY | Facility: CLINIC | Age: 65
End: 2025-07-08
Payer: COMMERCIAL

## 2025-07-08 VITALS
WEIGHT: 230 LBS | HEART RATE: 96 BPM | SYSTOLIC BLOOD PRESSURE: 126 MMHG | HEIGHT: 65 IN | BODY MASS INDEX: 38.32 KG/M2 | DIASTOLIC BLOOD PRESSURE: 86 MMHG

## 2025-07-08 DIAGNOSIS — I45.10 RBBB: ICD-10-CM

## 2025-07-08 DIAGNOSIS — I48.91 ATRIAL FIBRILLATION WITH RVR (MULTI): ICD-10-CM

## 2025-07-08 PROCEDURE — 4010F ACE/ARB THERAPY RXD/TAKEN: CPT | Performed by: INTERNAL MEDICINE

## 2025-07-08 PROCEDURE — 99213 OFFICE O/P EST LOW 20 MIN: CPT | Performed by: INTERNAL MEDICINE

## 2025-07-08 PROCEDURE — 3044F HG A1C LEVEL LT 7.0%: CPT | Performed by: INTERNAL MEDICINE

## 2025-07-08 PROCEDURE — 93000 ELECTROCARDIOGRAM COMPLETE: CPT | Performed by: INTERNAL MEDICINE

## 2025-07-08 PROCEDURE — 3008F BODY MASS INDEX DOCD: CPT | Performed by: INTERNAL MEDICINE

## 2025-07-08 PROCEDURE — 3074F SYST BP LT 130 MM HG: CPT | Performed by: INTERNAL MEDICINE

## 2025-07-08 PROCEDURE — 3079F DIAST BP 80-89 MM HG: CPT | Performed by: INTERNAL MEDICINE

## 2025-07-08 RX ORDER — FLECAINIDE ACETATE 50 MG/1
50 TABLET ORAL 2 TIMES DAILY
Qty: 60 TABLET | Refills: 11 | Status: SHIPPED | OUTPATIENT
Start: 2025-07-08 | End: 2026-07-08

## 2025-07-08 RX ORDER — APIXABAN 5 MG/1
5 TABLET, FILM COATED ORAL 2 TIMES DAILY
Qty: 60 TABLET | Refills: 6 | Status: SHIPPED | OUTPATIENT
Start: 2025-07-08

## 2025-07-08 NOTE — PROGRESS NOTES
Referring Provider: Dr. Harper  Reason for Consult: New onset atrial fibrillation    History of Present Illness:      Valentine Calero is a 64 y.o. year old female patient with a history significant for type 2 diabetes, hypertension, GRISELDA on CPAP, newly diagnosed paroxysmal atrial fibrillation who is referred by Dr. Harper for atrial fibrillation.    Found to have AF at PCPs office in January of 2025 with heart rates in the 150s.  Patient was started on metoprolol succinate and Eliquis at that time.  TSH was within normal limits.  She then had another episode of atrial fibrillation after a fire alarm at work when off, and ended up having to go to the emergency room at [facility].  She received IV fluids and IV metoprolol, which successfully transition her back to normal sinus rhythm.  Since then, she has been switched to metoprolol tartrate 100 mg twice daily and has been to normal sinus rhythm.  She is symptomatic while in atrial fibrillation now, with palpitations and fatigue.    At her last visit, we discussed treatment options. She preferred antiarrhythmic drug therapy rather than ablation. Ischemic workup was then ordered which showed no CAD. She continues to feel fatigue with the AF.       Focused Cardiovascular Problem List:  Type 2 DM  Hypertension  GRISELDA on CPAP  Symptomatic paroxysmal atrial fibrillation: [id number] equals 3 (female, hypertension, diabetes).  On Eliquis and metoprolol.  Right bundle branch block      Past Medical and Surgical History:  Ms. Calero  has a past medical history of Abnormal ECG (01/19/2025), Anxiety, Atrial fibrillation (Multi), Cancer (Multi), Diabetes mellitus (Multi), Encounter for screening for malignant neoplasm of vagina, Hypertension, Other conditions influencing health status, Personal history of other medical treatment, S/P hysterectomy (11/17/2011), and Sleep apnea.    has a past surgical history that includes Other surgical history  (2019); Other surgical history (2019); Breast surgery (Bilateral); Breast biopsy (Right, ); Breast biopsy (Right, ); Hysterectomy (); Cholecystectomy; Telford tooth extraction;  section, low transverse; and Reduction mammaplasty ().    Social History:  Social History     Tobacco Use    Smoking status: Never    Smokeless tobacco: Never   Substance Use Topics    Alcohol use: Not Currently     Comment: Occasional User        Relevant Family History:   Family History   Problem Relation Name Age of Onset    Hyperlipidemia Mother Josette     Miscarriages / Stillbirths Mother Josette     Vision loss Mother Josette     Alcohol abuse Father Rashad     Heart disease Father Rashad     Hypertension Father Rashad     Arthritis Father's Sister Marlyn     Atrial fibrillation Father's Sister Marlyn     Diabetes Maternal Grandmother Felecia Walters     Diabetes type II Maternal Grandmother Fleecia Walters     Arthritis Paternal Grandmother Hanh Nelson     Stroke Paternal Grandmother Hanh Nelson     Alcohol abuse Father Rashad     Hypertension Father Rashad     Arthritis Father Rashad     Heart disease Father Rashad     Hypertension Mother Josette 50 - 59    Hyperlipidemia Mother Josette     Miscarriages / Stillbirths Mother Josette 30 - 39    Vision loss Mother Josette 60 - 69    Stroke Paternal Grandmother Hanh Nelson     Arthritis Paternal Grandmother Hanh Nelson     Arthritis Father's Sister Marlyn     Atrial fibrillation Father's Sister Marlyn     Diabetes Maternal Grandmother Felecia Walters     Diabetes type II Maternal Grandmother Felecia Walters     Abnormal EKG Father Rashad 50 - 59        Allergies:  No Known Allergies     Medications:  Current Outpatient Medications   Medication Instructions    apixaban (ELIQUIS) 5 mg, oral, 2 times daily    ergocalciferol (VITAMIN D-2) 1,250 mcg, oral, Once Weekly, After 3 months, reduce to once a month.    hydroCHLOROthiazide (HYDRODIURIL) 25 mg, oral, Daily    lisinopril 40 mg, oral,  "Daily before breakfast    loratadine (CLARITIN) 10 mg, oral, Daily    magnesium oxide (MAG-OX) 400 mg, oral, Daily    metFORMIN  mg 24 hr tablet Take 2 tabs BID.  Do not crush, chew, or split.    metoprolol tartrate (LOPRESSOR) 100 mg, oral, 2 times daily    PARoxetine (PAXIL) 20 mg, oral, Daily    rosuvastatin (CRESTOR) 5 mg, oral, Daily    Rybelsus 14 mg, oral, Daily    vit C/E/zinc/lutein/zeaxanthin (OCUVITE EYE HEALTH ORAL) 1 tablet, Daily         Objective   Physical Exam:  Last Recorded Vitals:      1/27/2025     4:04 PM 2/4/2025    11:50 AM 2/20/2025     9:49 AM 4/7/2025     2:35 PM 4/15/2025     8:37 AM 6/13/2025    10:29 AM 7/8/2025    12:02 PM   Vitals   Systolic 158 143 138 132 134 110 126   Diastolic 98 98 90 80 72 76 86   BP Location Left arm    Left arm  Left arm   Heart Rate 76 78  79 72 100 96   Temp  36.2 °C (97.2 °F) 36.8 °C (98.2 °F) 36.3 °C (97.4 °F)  36.3 °C (97.4 °F)    Resp   16       Height 1.651 m (5' 5\")   1.651 m (5' 5\") 1.651 m (5' 5\") 1.651 m (5' 5\") 1.651 m (5' 5\")   Weight (lb) 232 227.25 229 230.5 231.4 236.5 230   BMI 38.61 kg/m2 37.82 kg/m2 38.11 kg/m2 38.36 kg/m2 38.51 kg/m2 39.36 kg/m2 38.27 kg/m2   BSA (m2) 2.19 m2 2.17 m2 2.18 m2 2.19 m2 2.19 m2 2.22 m2 2.18 m2   Visit Report Report Report Report Report Report Report Report    Visit Vitals  /86 (BP Location: Left arm, Patient Position: Sitting)   Pulse 96   Ht 1.651 m (5' 5\")   Wt 104 kg (230 lb)   BMI 38.27 kg/m²   OB Status Postmenopausal   Smoking Status Never   BSA 2.18 m²      Gen: NAD, sitting comfortably  HEENT: NC/AT  Card: RRR, no m/r/g  Pulm: Clear to auscultation bilaterally  Ext: No LE edema  Neuro: No focal deficits    Diagnostic Results      My Independent nterpretation of Reviewed Study(s):  Prior ECGs (reviewed and my interpretation):   7/8/2025: Atrial fibrillation, right bundle branch block  4/15/2025: Normal sinus rhythm, right bundle branch block    Echocardiography:  3/10/2025: LVEF 60%, normal " left atrial size     Stress test:  6/2025: Echo stress test with normal EF, no inducible ischemia        Relevant Labs:  Lab Results   Component Value Date    CREATININE 0.67 02/17/2025    CREATININE 0.63 07/11/2023    CREATININE 0.72 01/20/2023    K 4.2 02/17/2025    K 3.9 07/11/2023    K 3.8 01/20/2023    HGBA1C 6.7 (A) 01/06/2025    HGBA1C 6.5 05/13/2024    HGBA1C 7.7 (A) 11/06/2023    HGB 15.8 01/20/2023    HGB 15.2 07/27/2022    HGB 15.7 12/11/2020    AST 17 02/17/2025    AST 29 07/11/2023    AST 34 01/20/2023    ALT 21 02/17/2025    ALT 28 07/11/2023    ALT 31 01/20/2023     TSH 3.75     Assessment/Plan   Assessment and Plan:  Valentine Calero is a 64 y.o. year old female patient who is referred for management and evaluation of symptomatic paroxysmal atrial fibrillation. Since being switched to metoprolol 100mg twice daily her episodes have been under much better control.     We talked in depth about treatment options including ablation versus antiarrhythmic drug therapy to prevent further episodes of atrial fibrillation. She prefers a trial of antiarrhythmic drug therapies before considering ablation. Given that her ischemic workup is negative, we will start her on flecainide. Additionally, will plan for cardioversion next week.     #Symptomatic paroxysmal atrial fibrillation  - Continue apixaban 5mg twice daily  - Continue metoprolol tartrate 100mg BID  - Start flecainide 50mg twice daily  - Plan for DCCV next week             Return to Clinic: Patient should return to the EP Clinic after cardioversion    Thank you very much for allowing me to participate in the care of this patient. Please do not hesitate to contact me with any further questions or concerns.    Daniela Sierra MD  Clinical Cardiac Electrophysiologist, [facility]    of Medicine, [facility] [employer]  Director of Atrial Fibrillation Ablation,  [facility]  Director of Ventricular Arrhythmias Research, [facility]  Office Phone Number: [phone]

## 2025-07-11 ENCOUNTER — TELEPHONE (OUTPATIENT)
Dept: CARDIOLOGY | Facility: HOSPITAL | Age: 65
End: 2025-07-11

## 2025-07-14 ENCOUNTER — TELEPHONE (OUTPATIENT)
Dept: CARDIOLOGY | Facility: HOSPITAL | Age: 65
End: 2025-07-14

## 2025-07-14 ENCOUNTER — HOSPITAL ENCOUNTER (OUTPATIENT)
Dept: CARDIOLOGY | Facility: HOSPITAL | Age: 65
Discharge: HOME | End: 2025-07-14
Payer: COMMERCIAL

## 2025-07-14 ENCOUNTER — ANESTHESIA EVENT (OUTPATIENT)
Dept: CARDIOLOGY | Facility: HOSPITAL | Age: 65
End: 2025-07-14
Payer: COMMERCIAL

## 2025-07-14 ENCOUNTER — ANESTHESIA (OUTPATIENT)
Dept: CARDIOLOGY | Facility: HOSPITAL | Age: 65
End: 2025-07-14
Payer: COMMERCIAL

## 2025-07-14 DIAGNOSIS — I45.10 RBBB: ICD-10-CM

## 2025-07-14 DIAGNOSIS — I48.0 PAF (PAROXYSMAL ATRIAL FIBRILLATION) (MULTI): ICD-10-CM

## 2025-07-14 DIAGNOSIS — I48.91 ATRIAL FIBRILLATION WITH RVR (MULTI): Primary | ICD-10-CM

## 2025-07-14 DIAGNOSIS — I48.91 ATRIAL FIBRILLATION WITH RVR (MULTI): ICD-10-CM

## 2025-07-14 LAB
ATRIAL RATE: 73 BPM
P AXIS: 52 DEGREES
P OFFSET: 201 MS
P ONSET: 132 MS
PR INTERVAL: 180 MS
Q ONSET: 222 MS
QRS COUNT: 13 BEATS
QRS DURATION: 172 MS
QT INTERVAL: 470 MS
QTC CALCULATION(BAZETT): 517 MS
QTC FREDERICIA: 502 MS
R AXIS: 122 DEGREES
T AXIS: 0 DEGREES
T OFFSET: 457 MS
VENTRICULAR RATE: 73 BPM

## 2025-07-14 PROCEDURE — 93010 ELECTROCARDIOGRAM REPORT: CPT | Performed by: INTERNAL MEDICINE

## 2025-07-14 PROCEDURE — 93005 ELECTROCARDIOGRAM TRACING: CPT

## 2025-07-14 RX ORDER — SODIUM CHLORIDE 9 MG/ML
20 INJECTION, SOLUTION INTRAVENOUS CONTINUOUS
Status: DISCONTINUED | OUTPATIENT
Start: 2025-07-14 | End: 2025-07-14 | Stop reason: HOSPADM

## 2025-07-14 NOTE — ANESTHESIA PREPROCEDURE EVALUATION
"Patient: Valentine Calero \"Jaclyn\"    Procedure Information       Date/Time: 07/14/25 1530    Scheduled providers: Daniela Sierra MD    Procedure: CARDIOVERSION EXTERNAL    Location: [facility] ([place], [place])            Relevant Problems   Cardiac   (+) Atrial fibrillation with RVR (Multi)   (+) Hyperlipidemia   (+) PAF (paroxysmal atrial fibrillation) (Multi)   (+) Primary hypertension      Neuro   (+) Anxiety disorder      Endocrine   (+) Type 2 diabetes mellitus with complication, without long-term current use of insulin (Multi)      Musculoskeletal   (+) Osteoarthritis of cervical spine      GYN   (+) Cervical cancer       Clinical information reviewed:                   NPO Detail:  No data recorded     Physical Exam    Airway  Mallampati: II     Cardiovascular   Rhythm: regular  Rate: normal     Dental    Pulmonary - normal exam   Abdominal - normal exam           Anesthesia Plan    History of general anesthesia?: yes  History of complications of general anesthesia?: no    ASA 3     MAC     intravenous induction   Postoperative pain plan includes opioids.  Anesthetic plan and risks discussed with patient.      "

## 2025-07-14 NOTE — TELEPHONE ENCOUNTER
Patient came in for elective cardioversion today. EKG showed normal sinus rhythm. However, EKG also showed that QRSd was prolonged at 176ms, up from 140ms.     Given this, I would recommend stopping the flecainide due to the risk of bradycardia. We can try alternative medicines. Options include dronedarone and dofetilide, or catheter ablation.    I spoke with patient. She is interested in dronedarone. It requires prior authorization. Will send it in and work on prior authorization.

## 2025-07-15 ENCOUNTER — TELEPHONE (OUTPATIENT)
Dept: CARDIOLOGY | Facility: CLINIC | Age: 65
End: 2025-07-15
Payer: COMMERCIAL

## 2025-07-23 ENCOUNTER — APPOINTMENT (OUTPATIENT)
Dept: OTOLARYNGOLOGY | Facility: CLINIC | Age: 65
End: 2025-07-23
Payer: COMMERCIAL

## 2025-07-23 DIAGNOSIS — E04.1 THYROID NODULE: Primary | ICD-10-CM

## 2025-07-23 PROCEDURE — 99203 OFFICE O/P NEW LOW 30 MIN: CPT | Performed by: OTOLARYNGOLOGY

## 2025-07-23 PROCEDURE — 3044F HG A1C LEVEL LT 7.0%: CPT | Performed by: OTOLARYNGOLOGY

## 2025-07-23 PROCEDURE — 4010F ACE/ARB THERAPY RXD/TAKEN: CPT | Performed by: OTOLARYNGOLOGY

## 2025-07-23 NOTE — PROGRESS NOTES
"Subjective   Patient ID: Valentine Calero \"Augusto" is a 64 y.o. female who presents for THYROID NODULE.    HPI  New patient being seen for thyroid nodule. These were initially found incidentally on MRI neck in May of this year. Thyroid US completed 5/27/25 shows right 2.3cm TR4 nodule, right 1.7cm TR4 nodule, left 2cm TR4 nodule.     All remaining head neck inquiry otherwise negative.     Review of Systems   Constitutional: Negative.    HENT: Negative.     Respiratory: Negative.     Cardiovascular: Negative.    Neurological: Negative.      Physical Exam  General appearance: No acute distress. Normal facies. Symmetric facial movement. No gross lesions of the face are noted.  Ears:  The external ear structures appear normal. The ear canals patent and the tympanic membranes are intact without evidence of air-fluid levels, retraction, or congenital defects.    Nose:  Anterior rhinoscopy notes essentially a midline nasal septum. Examination is noted for normal healthy mucosal membranes without any evidence of lesions, polyps, or exudate.   Throat/Oral mucosa:  The tongue is normally mobile. There are no lesions on the gingiva, buccal, or oral mucosa. There are no oral cavity masses.  Neck:  The neck is negative for mass lymphadenopathy. The trachea and parotid are clear. The thyroid bed is grossly unremarkable. The salivary gland structures are grossly unremarkable.    Assessment/Plan   64yr old female presents for Thyroid US review. Recommend ultrasound-guided biopsy and will call her to discuss results following that procedure.   "

## 2025-07-30 ENCOUNTER — APPOINTMENT (OUTPATIENT)
Dept: ORTHOPEDIC SURGERY | Facility: CLINIC | Age: 65
End: 2025-07-30
Payer: COMMERCIAL

## 2025-07-30 ENCOUNTER — TELEPHONE (OUTPATIENT)
Dept: OTOLARYNGOLOGY | Facility: CLINIC | Age: 65
End: 2025-07-30

## 2025-07-30 DIAGNOSIS — E04.1 THYROID NODULE: Primary | ICD-10-CM

## 2025-08-02 LAB
ERYTHROCYTE [DISTWIDTH] IN BLOOD BY AUTOMATED COUNT: 13 % (ref 11–15)
HCT VFR BLD AUTO: 44.1 % (ref 35–45)
HGB BLD-MCNC: 15 G/DL (ref 11.7–15.5)
INR PPP: 1
MCH RBC QN AUTO: 31.6 PG (ref 27–33)
MCHC RBC AUTO-ENTMCNC: 34 G/DL (ref 32–36)
MCV RBC AUTO: 92.8 FL (ref 80–100)
PLATELET # BLD AUTO: 414 THOUSAND/UL (ref 140–400)
PMV BLD REES-ECKER: 10.8 FL (ref 7.5–12.5)
PROTHROMBIN TIME: 11.1 SEC (ref 9–11.5)
RBC # BLD AUTO: 4.75 MILLION/UL (ref 3.8–5.1)
WBC # BLD AUTO: 10.3 THOUSAND/UL (ref 3.8–10.8)

## 2025-08-06 ENCOUNTER — HOSPITAL ENCOUNTER (OUTPATIENT)
Dept: RADIOLOGY | Facility: HOSPITAL | Age: 65
Discharge: HOME | End: 2025-08-06
Payer: COMMERCIAL

## 2025-08-06 VITALS
RESPIRATION RATE: 16 BRPM | DIASTOLIC BLOOD PRESSURE: 83 MMHG | SYSTOLIC BLOOD PRESSURE: 137 MMHG | HEART RATE: 71 BPM | OXYGEN SATURATION: 94 %

## 2025-08-06 DIAGNOSIS — E04.1 NONTOXIC SINGLE THYROID NODULE: ICD-10-CM

## 2025-08-06 PROCEDURE — 2500000004 HC RX 250 GENERAL PHARMACY W/ HCPCS (ALT 636 FOR OP/ED): Performed by: RADIOLOGY

## 2025-08-06 PROCEDURE — 88172 CYTP DX EVAL FNA 1ST EA SITE: CPT | Mod: TC | Performed by: OTOLARYNGOLOGY

## 2025-08-06 PROCEDURE — 76942 ECHO GUIDE FOR BIOPSY: CPT

## 2025-08-06 RX ORDER — LIDOCAINE HYDROCHLORIDE 10 MG/ML
INJECTION, SOLUTION EPIDURAL; INFILTRATION; INTRACAUDAL; PERINEURAL
Status: COMPLETED | OUTPATIENT
Start: 2025-08-06 | End: 2025-08-06

## 2025-08-06 RX ADMIN — LIDOCAINE HYDROCHLORIDE 6 ML: 10 INJECTION, SOLUTION EPIDURAL; INFILTRATION; INTRACAUDAL; PERINEURAL at 11:25

## 2025-08-06 ASSESSMENT — PAIN SCALES - GENERAL
PAINLEVEL_OUTOF10: 0 - NO PAIN
PAINLEVEL_OUTOF10: 0 - NO PAIN

## 2025-08-07 LAB
LABORATORY COMMENT REPORT: NORMAL
LABORATORY COMMENT REPORT: NORMAL
PATH REPORT.FINAL DX SPEC: NORMAL
PATH REPORT.GROSS SPEC: NORMAL
PATH REPORT.INTRAOP OBS SPEC DOC: NORMAL
PATH REPORT.TOTAL CANCER: NORMAL

## 2025-08-15 ENCOUNTER — APPOINTMENT (OUTPATIENT)
Dept: RADIOLOGY | Facility: HOSPITAL | Age: 65
End: 2025-08-15
Payer: COMMERCIAL

## 2025-08-28 DIAGNOSIS — E11.8 TYPE 2 DIABETES MELLITUS WITH COMPLICATION, WITHOUT LONG-TERM CURRENT USE OF INSULIN (MULTI): ICD-10-CM

## 2025-08-28 RX ORDER — ORAL SEMAGLUTIDE 14 MG/1
14 TABLET ORAL DAILY
Qty: 90 TABLET | Refills: 0 | Status: SHIPPED | OUTPATIENT
Start: 2025-08-28

## 2025-09-05 LAB — SCAN RESULT: NORMAL

## 2025-10-07 ENCOUNTER — APPOINTMENT (OUTPATIENT)
Dept: PRIMARY CARE | Facility: CLINIC | Age: 65
End: 2025-10-07
Payer: COMMERCIAL

## 2025-10-15 ENCOUNTER — APPOINTMENT (OUTPATIENT)
Dept: CARDIOLOGY | Facility: CLINIC | Age: 65
End: 2025-10-15
Payer: COMMERCIAL

## 2025-10-29 ENCOUNTER — APPOINTMENT (OUTPATIENT)
Dept: CARDIOLOGY | Facility: CLINIC | Age: 65
End: 2025-10-29
Payer: COMMERCIAL

## 2026-01-26 ENCOUNTER — APPOINTMENT (OUTPATIENT)
Dept: CARDIOLOGY | Facility: CLINIC | Age: 66
End: 2026-01-26
Payer: COMMERCIAL